# Patient Record
Sex: FEMALE | Race: WHITE | NOT HISPANIC OR LATINO | URBAN - METROPOLITAN AREA
[De-identification: names, ages, dates, MRNs, and addresses within clinical notes are randomized per-mention and may not be internally consistent; named-entity substitution may affect disease eponyms.]

---

## 2018-04-17 ENCOUNTER — INPATIENT (INPATIENT)
Facility: HOSPITAL | Age: 63
LOS: 9 days | Discharge: NURSING, REHAB OR LONG-TERM CARE | DRG: 494 | End: 2018-04-27
Attending: ORTHOPAEDIC SURGERY | Admitting: ORTHOPAEDIC SURGERY
Payer: COMMERCIAL

## 2018-04-17 VITALS
RESPIRATION RATE: 17 BRPM | HEART RATE: 88 BPM | TEMPERATURE: 97 F | DIASTOLIC BLOOD PRESSURE: 68 MMHG | SYSTOLIC BLOOD PRESSURE: 119 MMHG | OXYGEN SATURATION: 92 %
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
## 2018-04-17 LAB
ALBUMIN SERPL ELPH-MCNC: 4.2 G/DL — SIGNIFICANT CHANGE UP (ref 3.3–5)
ALP SERPL-CCNC: 68 U/L — SIGNIFICANT CHANGE UP (ref 40–120)
ALT FLD-CCNC: 20 U/L — SIGNIFICANT CHANGE UP (ref 10–45)
ANION GAP SERPL CALC-SCNC: 9 MMOL/L — SIGNIFICANT CHANGE UP (ref 5–17)
APPEARANCE UR: CLEAR — SIGNIFICANT CHANGE UP
APTT BLD: 34 SEC — SIGNIFICANT CHANGE UP (ref 27.5–37.4)
AST SERPL-CCNC: 17 U/L — SIGNIFICANT CHANGE UP (ref 10–40)
BASOPHILS NFR BLD AUTO: 0.2 % — SIGNIFICANT CHANGE UP (ref 0–2)
BILIRUB SERPL-MCNC: 0.4 MG/DL — SIGNIFICANT CHANGE UP (ref 0.2–1.2)
BILIRUB UR-MCNC: NEGATIVE — SIGNIFICANT CHANGE UP
BUN SERPL-MCNC: 13 MG/DL — SIGNIFICANT CHANGE UP (ref 7–23)
CALCIUM SERPL-MCNC: 9.5 MG/DL — SIGNIFICANT CHANGE UP (ref 8.4–10.5)
CHLORIDE SERPL-SCNC: 102 MMOL/L — SIGNIFICANT CHANGE UP (ref 96–108)
CO2 SERPL-SCNC: 30 MMOL/L — SIGNIFICANT CHANGE UP (ref 22–31)
COLOR SPEC: YELLOW — SIGNIFICANT CHANGE UP
CREAT SERPL-MCNC: 0.73 MG/DL — SIGNIFICANT CHANGE UP (ref 0.5–1.3)
DIFF PNL FLD: NEGATIVE — SIGNIFICANT CHANGE UP
EOSINOPHIL NFR BLD AUTO: 0.3 % — SIGNIFICANT CHANGE UP (ref 0–6)
GLUCOSE SERPL-MCNC: 119 MG/DL — HIGH (ref 70–99)
GLUCOSE UR QL: NEGATIVE — SIGNIFICANT CHANGE UP
HCT VFR BLD CALC: 39 % — SIGNIFICANT CHANGE UP (ref 34.5–45)
HGB BLD-MCNC: 12.7 G/DL — SIGNIFICANT CHANGE UP (ref 11.5–15.5)
INR BLD: 1 — SIGNIFICANT CHANGE UP (ref 0.88–1.16)
KETONES UR-MCNC: NEGATIVE — SIGNIFICANT CHANGE UP
LEUKOCYTE ESTERASE UR-ACNC: NEGATIVE — SIGNIFICANT CHANGE UP
LYMPHOCYTES # BLD AUTO: 22.7 % — SIGNIFICANT CHANGE UP (ref 13–44)
MCHC RBC-ENTMCNC: 28.9 PG — SIGNIFICANT CHANGE UP (ref 27–34)
MCHC RBC-ENTMCNC: 32.6 G/DL — SIGNIFICANT CHANGE UP (ref 32–36)
MCV RBC AUTO: 88.6 FL — SIGNIFICANT CHANGE UP (ref 80–100)
MONOCYTES NFR BLD AUTO: 11.8 % — SIGNIFICANT CHANGE UP (ref 2–14)
NEUTROPHILS NFR BLD AUTO: 65 % — SIGNIFICANT CHANGE UP (ref 43–77)
NITRITE UR-MCNC: NEGATIVE — SIGNIFICANT CHANGE UP
PH UR: 6.5 — SIGNIFICANT CHANGE UP (ref 5–8)
PLATELET # BLD AUTO: 312 K/UL — SIGNIFICANT CHANGE UP (ref 150–400)
POTASSIUM SERPL-MCNC: 4.4 MMOL/L — SIGNIFICANT CHANGE UP (ref 3.5–5.3)
POTASSIUM SERPL-SCNC: 4.4 MMOL/L — SIGNIFICANT CHANGE UP (ref 3.5–5.3)
PROT SERPL-MCNC: 7 G/DL — SIGNIFICANT CHANGE UP (ref 6–8.3)
PROT UR-MCNC: NEGATIVE MG/DL — SIGNIFICANT CHANGE UP
PROTHROM AB SERPL-ACNC: 11.1 SEC — SIGNIFICANT CHANGE UP (ref 9.8–12.7)
RBC # BLD: 4.4 M/UL — SIGNIFICANT CHANGE UP (ref 3.8–5.2)
RBC # FLD: 13.7 % — SIGNIFICANT CHANGE UP (ref 10.3–16.9)
SODIUM SERPL-SCNC: 141 MMOL/L — SIGNIFICANT CHANGE UP (ref 135–145)
SP GR SPEC: 1.01 — SIGNIFICANT CHANGE UP (ref 1–1.03)
UROBILINOGEN FLD QL: 0.2 E.U./DL — SIGNIFICANT CHANGE UP
WBC # BLD: 12.6 K/UL — HIGH (ref 3.8–10.5)
WBC # FLD AUTO: 12.6 K/UL — HIGH (ref 3.8–10.5)

## 2018-04-17 PROCEDURE — 73700 CT LOWER EXTREMITY W/O DYE: CPT | Mod: 26,LT

## 2018-04-17 RX ORDER — METOCLOPRAMIDE HCL 10 MG
10 TABLET ORAL EVERY 6 HOURS
Qty: 0 | Refills: 0 | Status: DISCONTINUED | OUTPATIENT
Start: 2018-04-17 | End: 2018-04-24

## 2018-04-17 RX ORDER — ACETAMINOPHEN 500 MG
650 TABLET ORAL EVERY 6 HOURS
Qty: 0 | Refills: 0 | Status: DISCONTINUED | OUTPATIENT
Start: 2018-04-17 | End: 2018-04-24

## 2018-04-17 RX ORDER — MORPHINE SULFATE 50 MG/1
4 CAPSULE, EXTENDED RELEASE ORAL EVERY 4 HOURS
Qty: 0 | Refills: 0 | Status: DISCONTINUED | OUTPATIENT
Start: 2018-04-17 | End: 2018-04-24

## 2018-04-17 RX ORDER — OXYCODONE HYDROCHLORIDE 5 MG/1
10 TABLET ORAL EVERY 4 HOURS
Qty: 0 | Refills: 0 | Status: DISCONTINUED | OUTPATIENT
Start: 2018-04-17 | End: 2018-04-24

## 2018-04-17 RX ORDER — SODIUM CHLORIDE 9 MG/ML
1000 INJECTION, SOLUTION INTRAVENOUS
Qty: 0 | Refills: 0 | Status: DISCONTINUED | OUTPATIENT
Start: 2018-04-17 | End: 2018-04-24

## 2018-04-17 RX ORDER — OXYCODONE HYDROCHLORIDE 5 MG/1
5 TABLET ORAL EVERY 4 HOURS
Qty: 0 | Refills: 0 | Status: DISCONTINUED | OUTPATIENT
Start: 2018-04-17 | End: 2018-04-24

## 2018-04-17 RX ORDER — MAGNESIUM HYDROXIDE 400 MG/1
30 TABLET, CHEWABLE ORAL DAILY
Qty: 0 | Refills: 0 | Status: DISCONTINUED | OUTPATIENT
Start: 2018-04-17 | End: 2018-04-24

## 2018-04-17 RX ORDER — HEPARIN SODIUM 5000 [USP'U]/ML
5000 INJECTION INTRAVENOUS; SUBCUTANEOUS ONCE
Qty: 0 | Refills: 0 | Status: COMPLETED | OUTPATIENT
Start: 2018-04-17 | End: 2018-04-17

## 2018-04-17 RX ORDER — DOCUSATE SODIUM 100 MG
100 CAPSULE ORAL THREE TIMES A DAY
Qty: 0 | Refills: 0 | Status: DISCONTINUED | OUTPATIENT
Start: 2018-04-17 | End: 2018-04-24

## 2018-04-17 RX ADMIN — HEPARIN SODIUM 5000 UNIT(S): 5000 INJECTION INTRAVENOUS; SUBCUTANEOUS at 21:21

## 2018-04-17 RX ADMIN — MORPHINE SULFATE 4 MILLIGRAM(S): 50 CAPSULE, EXTENDED RELEASE ORAL at 20:03

## 2018-04-17 RX ADMIN — Medication 100 MILLIGRAM(S): at 21:21

## 2018-04-17 RX ADMIN — MORPHINE SULFATE 4 MILLIGRAM(S): 50 CAPSULE, EXTENDED RELEASE ORAL at 20:30

## 2018-04-17 NOTE — H&P ADULT - NSHPPHYSICALEXAM_GEN_ALL_CORE
WWP, BCR  DP/PT could not palpate due to the splint. But Toes WWP  Motor: EHL/FHL splint in place  Sensory: DP/SP/AT, MP/LP/S/S SILT--- Could not assess due to splint but can feel the toes  ROM: Can wiggle toes. In AO splint, that was partially removed to examine the skin. No break in the integrity of the skin or any clisters. Although significant swelling.  Deformity: L Ankle Splint from OSH

## 2018-04-17 NOTE — H&P ADULT - ASSESSMENT
62 F with L Ankle fracture.  1. Pre Op  2. NPO p MN  3. Elevate  4. Ice pack  5. Pre Op clearance  6. Added on  7. Consented  8. CT  9. Pain Control  10. SQH x 1  11. IVFs

## 2018-04-17 NOTE — H&P ADULT - HISTORY OF PRESENT ILLNESS
62 F, s/p fall with L ankle fracture. Patient presented to the ED at [facility] and has been transferred here for surgical management.

## 2018-04-18 DIAGNOSIS — S82.899A OTHER FRACTURE OF UNSPECIFIED LOWER LEG, INITIAL ENCOUNTER FOR CLOSED FRACTURE: ICD-10-CM

## 2018-04-18 DIAGNOSIS — Z01.818 ENCOUNTER FOR OTHER PREPROCEDURAL EXAMINATION: ICD-10-CM

## 2018-04-18 LAB
BLD GP AB SCN SERPL QL: NEGATIVE — SIGNIFICANT CHANGE UP
RH IG SCN BLD-IMP: POSITIVE — SIGNIFICANT CHANGE UP

## 2018-04-18 PROCEDURE — 99222 1ST HOSP IP/OBS MODERATE 55: CPT | Mod: GC

## 2018-04-18 PROCEDURE — 93010 ELECTROCARDIOGRAM REPORT: CPT

## 2018-04-18 RX ORDER — HEPARIN SODIUM 5000 [USP'U]/ML
5000 INJECTION INTRAVENOUS; SUBCUTANEOUS EVERY 12 HOURS
Qty: 0 | Refills: 0 | Status: DISCONTINUED | OUTPATIENT
Start: 2018-04-18 | End: 2018-04-18

## 2018-04-18 RX ORDER — HEPARIN SODIUM 5000 [USP'U]/ML
7500 INJECTION INTRAVENOUS; SUBCUTANEOUS EVERY 8 HOURS
Qty: 0 | Refills: 0 | Status: DISCONTINUED | OUTPATIENT
Start: 2018-04-18 | End: 2018-04-23

## 2018-04-18 RX ORDER — PANTOPRAZOLE SODIUM 20 MG/1
40 TABLET, DELAYED RELEASE ORAL
Qty: 0 | Refills: 0 | Status: DISCONTINUED | OUTPATIENT
Start: 2018-04-18 | End: 2018-04-24

## 2018-04-18 RX ORDER — HEPARIN SODIUM 5000 [USP'U]/ML
5000 INJECTION INTRAVENOUS; SUBCUTANEOUS ONCE
Qty: 0 | Refills: 0 | Status: DISCONTINUED | OUTPATIENT
Start: 2018-04-18 | End: 2018-04-18

## 2018-04-18 RX ADMIN — HEPARIN SODIUM 7500 UNIT(S): 5000 INJECTION INTRAVENOUS; SUBCUTANEOUS at 22:00

## 2018-04-18 RX ADMIN — MORPHINE SULFATE 4 MILLIGRAM(S): 50 CAPSULE, EXTENDED RELEASE ORAL at 16:05

## 2018-04-18 RX ADMIN — Medication 100 MILLIGRAM(S): at 21:16

## 2018-04-18 RX ADMIN — MORPHINE SULFATE 4 MILLIGRAM(S): 50 CAPSULE, EXTENDED RELEASE ORAL at 10:42

## 2018-04-18 RX ADMIN — OXYCODONE HYDROCHLORIDE 10 MILLIGRAM(S): 5 TABLET ORAL at 19:19

## 2018-04-18 RX ADMIN — MORPHINE SULFATE 4 MILLIGRAM(S): 50 CAPSULE, EXTENDED RELEASE ORAL at 10:57

## 2018-04-18 RX ADMIN — SODIUM CHLORIDE 100 MILLILITER(S): 9 INJECTION, SOLUTION INTRAVENOUS at 00:25

## 2018-04-18 RX ADMIN — OXYCODONE HYDROCHLORIDE 10 MILLIGRAM(S): 5 TABLET ORAL at 18:19

## 2018-04-18 RX ADMIN — MORPHINE SULFATE 4 MILLIGRAM(S): 50 CAPSULE, EXTENDED RELEASE ORAL at 05:38

## 2018-04-18 RX ADMIN — MORPHINE SULFATE 4 MILLIGRAM(S): 50 CAPSULE, EXTENDED RELEASE ORAL at 05:55

## 2018-04-18 RX ADMIN — MORPHINE SULFATE 4 MILLIGRAM(S): 50 CAPSULE, EXTENDED RELEASE ORAL at 15:49

## 2018-04-18 NOTE — PROGRESS NOTE ADULT - SUBJECTIVE AND OBJECTIVE BOX
Ortho Preop Note    Patient is a 62y old  Female who presents with a chief complaint of L Ankle PER Type 4 (2018 20:50)    Diagnosis: L Ankle PER type 4  Procedure: L Ankle ORIF  Surgeon: Octavio                          [id number]   12.6  )-----------( 312      ( 2018 22:39 )             39.0         141  |  102  |  13  ----------------------------<  119<H>  4.4   |  30  |  0.73    Ca    9.5      2018 22:39    TPro  7.0  /  Alb  4.2  /  TBili  0.4  /  DBili  x   /  AST    /  ALT  20  /  AlkPhos  68      PT/INR - ( 2018 22:39 )   PT: 11.1 sec;   INR: 1.00          PTT - ( 2018 22:39 )  PTT:34.0 sec  Urinalysis Basic - ( 2018 22:06 )    Color: Yellow / Appearance: Clear / S.010 / pH: x  Gluc: x / Ketone: NEGATIVE  / Bili: Negative / Urobili: 0.2 E.U./dL   Blood: x / Protein: NEGATIVE mg/dL / Nitrite: NEGATIVE   Leuk Esterase: NEGATIVE / RBC: x / WBC x   Sq Epi: x / Non Sq Epi: x / Bacteria: x        [ x] Type & Screen  [x ] CBC  [x ] BMP  [x ] PT/PTT/INR  [x ] Urinalysis  [x ] Chest X-ray  [x ] EKG  [x ] NPO/IVF  [x ] Consent  [ ] Clearance  [x ] Added on to OR Schedule  [x ] Anti-coagulation held  [ ] MRSA/MSSA Nasal Screen     Assessment & Plan:  62yFemale with L Ankle fracture  -For OR     Ortho Pager [phone]

## 2018-04-18 NOTE — PRE-OP CHECKLIST - SELECT TESTS ORDERED
BMP/PT/PTT/INR/EKG/Urinalysis/Type and Screen/CBC CBC/Urinalysis/Type and Screen/PT/PTT/INR/EKG/CXR/BMP

## 2018-04-18 NOTE — CONSULT NOTE ADULT - ATTENDING COMMENTS
Patient seen and examined with house-staff during bedside rounds.  Resident note read, including vitals, physical findings, laboratory data, and radiological reports.   Revisions included below.  Direct personal management at bed side and extensive interpretation of the data.  Plan was outlined and discussed in details with the housestaff.  Decision making of high complexity  Action taken for acute disease activity to reflect the level of care provided:  - medication reconciliation  - review laboratory data  - preop evaluation  - risk assessment

## 2018-04-18 NOTE — CONSULT NOTE ADULT - PROBLEM SELECTOR RECOMMENDATION 2
The patient's medical condition is optimized for surgery.  There is no contraindication for surgery.  There is no clinical evidence neither of angina, decompensated CHF, arrhthymias, nor valvular disease.   There is no limitation of exercise capacity.  MET is  5.  ASA class is2 .  Fitzpatrick cardiac risk factor is low .  DVT prophylaxis is indicated.  Pain control.  Early mobilization.  Avoid fluid overload.  I will discuss smoke cessation postop

## 2018-04-18 NOTE — CONSULT NOTE ADULT - SUBJECTIVE AND OBJECTIVE BOX
Patient is a 62y old  Female who presents with a chief complaint of L Ankle PER Type 4 (2018 20:50)      HPI:  62 F, s/p fall with L ankle fracture. Patient presented to the ED at [facility] and has been transferred here for surgical management. (2018 20:50)      PAST MEDICAL & SURGICAL HISTORY:  No pertinent past medical history  No significant past surgical history      FAMILY HISTORY:  No pertinent family history in first degree relatives      SOCIAL HISTORY:  Smoking Status: [x ] Current, [ ] Former, [ ] Never  Pack Years:    MEDICATIONS:  Pulmonary:    Antimicrobials:    Anticoagulants:    Onc:    GI/:  docusate sodium 100 milliGRAM(s) Oral three times a day  magnesium hydroxide Suspension 30 milliLiter(s) Oral daily  pantoprazole    Tablet 40 milliGRAM(s) Oral before breakfast    Endocrine:    Cardiac:    Other Medications:  acetaminophen   Tablet 650 milliGRAM(s) Oral every 6 hours PRN  acetaminophen   Tablet. 650 milliGRAM(s) Oral every 6 hours PRN  lactated ringers. 1000 milliLiter(s) IV Continuous <Continuous>  metoclopramide Injectable 10 milliGRAM(s) IV Push every 6 hours PRN  morphine  - Injectable 4 milliGRAM(s) IV Push every 4 hours PRN  oxyCODONE    IR 10 milliGRAM(s) Oral every 4 hours PRN  oxyCODONE    IR 5 milliGRAM(s) Oral every 4 hours PRN      Allergies    No Known Allergies    Intolerances        Vital Signs Last 24 Hrs  T(C): 37.5 (2018 05:27), Max: 37.5 (2018 05:27)  T(F): 99.5 (2018 05:27), Max: 99.5 (2018 05:27)  HR: 88 (2018 05:27) (87 - 88)  BP: 141/64 (2018 05:27) (119/68 - 141/64)  BP(mean): --  RR: 17 (2018 05:27) (17 - 18)  SpO2: 93% (2018 05:27) (92% - 93%)     @ 07:01  -   @ 07:00  --------------------------------------------------------  IN: 700 mL / OUT: 750 mL / NET: -50 mL          LABS:      CBC Full  -  ( 2018 22:39 )  WBC Count : 12.6 K/uL  Hemoglobin : 12.7 g/dL  Hematocrit : 39.0 %  Platelet Count - Automated : 312 K/uL  Mean Cell Volume : 88.6 fL  Mean Cell Hemoglobin : 28.9 pg  Mean Cell Hemoglobin Concentration : 32.6 g/dL  Auto Neutrophil # : x  Auto Lymphocyte # : x  Auto Monocyte # : x  Auto Eosinophil # : x  Auto Basophil # : x  Auto Neutrophil % : 65.0 %  Auto Lymphocyte % : 22.7 %  Auto Monocyte % : 11.8 %  Auto Eosinophil % : 0.3 %  Auto Basophil % : 0.2 %        141  |  102  |  13  ----------------------------<  119<H>  4.4   |  30  |  0.73    Ca    9.5      2018 22:39    TPro  7.0  /  Alb  4.2  /  TBili  0.4  /  DBili  x   /  AST  17  /  ALT  20  /  AlkPhos  68  -    PT/INR - ( 2018 22:39 )   PT: 11.1 sec;   INR: 1.00     CXR clear  - EKG RSR normal     PTT - ( 2018 22:39 )  PTT:34.0 sec      Urinalysis Basic - ( 2018 22:06 )    Color: Yellow / Appearance: Clear / S.010 / pH: x  Gluc: x / Ketone: NEGATIVE  / Bili: Negative / Urobili: 0.2 E.U./dL   Blood: x / Protein: NEGATIVE mg/dL / Nitrite: NEGATIVE   Leuk Esterase: NEGATIVE / RBC: x / WBC x   Sq Epi: x / Non Sq Epi: x / Bacteria: x                  RADIOLOGY & ADDITIONAL STUDIES (The following images were personally reviewed):

## 2018-04-19 ENCOUNTER — TRANSCRIPTION ENCOUNTER (OUTPATIENT)
Age: 63
End: 2018-04-19

## 2018-04-19 LAB
ANION GAP SERPL CALC-SCNC: 10 MMOL/L — SIGNIFICANT CHANGE UP (ref 5–17)
BUN SERPL-MCNC: 12 MG/DL — SIGNIFICANT CHANGE UP (ref 7–23)
CALCIUM SERPL-MCNC: 9.3 MG/DL — SIGNIFICANT CHANGE UP (ref 8.4–10.5)
CHLORIDE SERPL-SCNC: 99 MMOL/L — SIGNIFICANT CHANGE UP (ref 96–108)
CO2 SERPL-SCNC: 29 MMOL/L — SIGNIFICANT CHANGE UP (ref 22–31)
CREAT SERPL-MCNC: 0.72 MG/DL — SIGNIFICANT CHANGE UP (ref 0.5–1.3)
GLUCOSE SERPL-MCNC: 105 MG/DL — HIGH (ref 70–99)
HCT VFR BLD CALC: 38.7 % — SIGNIFICANT CHANGE UP (ref 34.5–45)
HGB BLD-MCNC: 12.6 G/DL — SIGNIFICANT CHANGE UP (ref 11.5–15.5)
MCHC RBC-ENTMCNC: 28.7 PG — SIGNIFICANT CHANGE UP (ref 27–34)
MCHC RBC-ENTMCNC: 32.6 G/DL — SIGNIFICANT CHANGE UP (ref 32–36)
MCV RBC AUTO: 88.2 FL — SIGNIFICANT CHANGE UP (ref 80–100)
PLATELET # BLD AUTO: 285 K/UL — SIGNIFICANT CHANGE UP (ref 150–400)
POTASSIUM SERPL-MCNC: 4.3 MMOL/L — SIGNIFICANT CHANGE UP (ref 3.5–5.3)
POTASSIUM SERPL-SCNC: 4.3 MMOL/L — SIGNIFICANT CHANGE UP (ref 3.5–5.3)
RBC # BLD: 4.39 M/UL — SIGNIFICANT CHANGE UP (ref 3.8–5.2)
RBC # FLD: 13.4 % — SIGNIFICANT CHANGE UP (ref 10.3–16.9)
SODIUM SERPL-SCNC: 138 MMOL/L — SIGNIFICANT CHANGE UP (ref 135–145)
WBC # BLD: 11.4 K/UL — HIGH (ref 3.8–10.5)
WBC # FLD AUTO: 11.4 K/UL — HIGH (ref 3.8–10.5)

## 2018-04-19 PROCEDURE — 71045 X-RAY EXAM CHEST 1 VIEW: CPT | Mod: 26

## 2018-04-19 RX ADMIN — HEPARIN SODIUM 7500 UNIT(S): 5000 INJECTION INTRAVENOUS; SUBCUTANEOUS at 13:41

## 2018-04-19 RX ADMIN — OXYCODONE HYDROCHLORIDE 10 MILLIGRAM(S): 5 TABLET ORAL at 16:05

## 2018-04-19 RX ADMIN — OXYCODONE HYDROCHLORIDE 10 MILLIGRAM(S): 5 TABLET ORAL at 21:55

## 2018-04-19 RX ADMIN — Medication 100 MILLIGRAM(S): at 13:42

## 2018-04-19 RX ADMIN — MAGNESIUM HYDROXIDE 30 MILLILITER(S): 400 TABLET, CHEWABLE ORAL at 13:42

## 2018-04-19 RX ADMIN — OXYCODONE HYDROCHLORIDE 10 MILLIGRAM(S): 5 TABLET ORAL at 17:05

## 2018-04-19 RX ADMIN — OXYCODONE HYDROCHLORIDE 10 MILLIGRAM(S): 5 TABLET ORAL at 10:27

## 2018-04-19 RX ADMIN — OXYCODONE HYDROCHLORIDE 10 MILLIGRAM(S): 5 TABLET ORAL at 11:27

## 2018-04-19 RX ADMIN — OXYCODONE HYDROCHLORIDE 10 MILLIGRAM(S): 5 TABLET ORAL at 22:21

## 2018-04-19 RX ADMIN — HEPARIN SODIUM 7500 UNIT(S): 5000 INJECTION INTRAVENOUS; SUBCUTANEOUS at 21:57

## 2018-04-19 NOTE — PROGRESS NOTE ADULT - SUBJECTIVE AND OBJECTIVE BOX
ORTHO NOTE    [x ] Pt seen/examined.  [ ] Pt without any complaints/in NAD.    [x ] Pt complains of: Left ankle pain slightly worse than yesterday, but controlled with meds.      ROS: [ ] Fever  [ ] Chills  [ ] CP [ ] SOB [ ] Dysnea  [ ] Palpitations [ ] Cough [ ] N/V/C/D [ ] Paresthia [ ] Other     [ ] ROS  otherwise negative    .    PHYSICAL EXAM:    Vital Signs Last 24 Hrs  T(C): 36.2 (19 Apr 2018 08:51), Max: 37.4 (19 Apr 2018 05:29)  T(F): 97.2 (19 Apr 2018 08:51), Max: 99.3 (19 Apr 2018 05:29)  HR: 93 (19 Apr 2018 08:51) (86 - 100)  BP: 133/61 (19 Apr 2018 08:51) (131/75 - 152/79)  BP(mean): --  RR: 15 (19 Apr 2018 08:51) (15 - 16)  SpO2: 93% (19 Apr 2018 08:52) (90% - 100%)    I&O's Detail    18 Apr 2018 07:01  -  19 Apr 2018 07:00  --------------------------------------------------------  IN:    lactated ringers.: 300 mL  Total IN: 300 mL    OUT:    Voided: 700 mL  Total OUT: 700 mL    Total NET: -400 mL      19 Apr 2018 07:01  -  19 Apr 2018 12:00  --------------------------------------------------------  IN:    Oral Fluid: 240 mL  Total IN: 240 mL    OUT:    Voided: 400 mL  Total OUT: 400 mL    Total NET: -160 mL           CAPILLARY BLOOD GLUCOSE                      Neuro:    Lungs:    CV:    ABD:     Ext: Left ankle 5/5 EHL FHL, SILT WWP  Swelling noted, small 1cm x1cm blister anterior shin    LABS   19 Apr 2018 08:29    138    |  99     |  12     ----------------------------<  105    4.3     |  29     |  0.72     Ca    9.3        19 Apr 2018 08:29                                   12.6   11.4  )-----------( 285      ( 19 Apr 2018 08:29 )             38.7             PT/INR - ( 17 Apr 2018 22:39 )   PT: 11.1 sec;   INR: 1.00          PTT - ( 17 Apr 2018 22:39 )  PTT:34.0 sec    [ ] Other Labs  [ ] None ordered            Please check or [place] when present:  •  Heart Failure:    [ ] Acute        [ ]  Acute on Chronic        [ ] Chronic         [ ] Diastolic     [ ]  Combined    •  MALCOLM:     [ ] ATN        [ ]  Renal medullary necrosis       [ ]  Renal cortical necrosis                  [ ] Other pathological Lesion:  •  CKD:  [ ] Stage I   [ ] Stage II  [ ] Stage III    [ ]Stage IV   [ ]  CKD V   [ ]  Other/Unspecified:    •  Abdominal Nutritional Status:   [ ] Malnutrition-See Nutrition note    [ ] Cachexia   [ ]  Other        [ ] Supplement ordered:            [ ] Morbid Obesity: BMI >=40         ASSESSMENT/PLAN: 62 F with left ankle fracture awaiting ORIF.  Plan for OR Tuesday when swelling subsides.    CONTINUE:          [ ] PT NWB LLE    [ ] DVT PPX-HSQ    [ ] Pain Mgt    [ ] Dispo plan-Pending clinical progression.

## 2018-04-19 NOTE — DISCHARGE NOTE ADULT - HOSPITAL COURSE
Admitted 4/17/18 with plan of L Ankle ORIF  Surgery  Nichole-op Antibiotics  Pain control  DVT prophylaxis  OOB/Physical Therapy Admitted 4/17/18 with plan of L Ankle ORIF  Surgery ORIF 4/24/18.  Nichole-op Antibiotics  Pain control  DVT prophylaxis  OOB/Physical Therapy

## 2018-04-19 NOTE — DISCHARGE NOTE ADULT - CARE PROVIDER_API CALL
Darren Tidwell), Orthopaedic Surgery Surgery  [address] [place] [address]  Phone: [phone]  Fax: [phone]

## 2018-04-19 NOTE — DISCHARGE NOTE ADULT - PATIENT PORTAL LINK FT
You can access the [facility] Patient Portal, offered by [facility], by registering with the following website: [email or web address]

## 2018-04-19 NOTE — DISCHARGE NOTE ADULT - CARE PLAN
Principal Discharge DX:	Ankle fracture  Goal:	Improvement Principal Discharge DX:	Ankle fracture  Goal:	Improvement  Assessment and plan of treatment:	You are non weight-bearing of the left leg while ambulating.   No strenuous activity, heavy lifting, driving or returning to work until cleared by MD.  Keep splint clean, dry, and intact. You may sponge-bathe.   Try to have regular bowel movements, take stool softener or laxative if necessary.  May take Pepcid or Zantac for upset stomach.  May take Aleve or Naproxen instead of Meloxicam.  Swelling may travel all the way down leg to foot, this is normal and will subside in a few weeks.  Call to schedule an appointment with Dr. Tidwell for follow up, if you have staples or sutures they will be removed in office.  Contact your doctor if you experience: fever greater than 101.5, chills, chest pain, difficulty breathing, redness or excessive drainage around the incision, other concerns.  Follow up with your primary care provider.

## 2018-04-19 NOTE — DISCHARGE NOTE ADULT - PLAN OF CARE
Improvement You are non weight-bearing of the left leg while ambulating.   No strenuous activity, heavy lifting, driving or returning to work until cleared by MD.  Keep splint clean, dry, and intact. You may sponge-bathe.   Try to have regular bowel movements, take stool softener or laxative if necessary.  May take Pepcid or Zantac for upset stomach.  May take Aleve or Naproxen instead of Meloxicam.  Swelling may travel all the way down leg to foot, this is normal and will subside in a few weeks.  Call to schedule an appointment with Dr. Tidwell for follow up, if you have staples or sutures they will be removed in office.  Contact your doctor if you experience: fever greater than 101.5, chills, chest pain, difficulty breathing, redness or excessive drainage around the incision, other concerns.  Follow up with your primary care provider.

## 2018-04-19 NOTE — PROGRESS NOTE ADULT - SUBJECTIVE AND OBJECTIVE BOX
Patient seen and examined at bedside.    No acute events.  Pain tolerable.     Denies chest pain/SOB.  No headache.  N PO for OR  today with Dr. Cunningham / Diann.    T(C): 37.4 (04-19-18 @ 05:29), Max: 37.4 (04-19-18 @ 05:29)  T(F): 99.3 (04-19-18 @ 05:29), Max: 99.3 (04-19-18 @ 05:29)  HR:  (86 - 100)  BP:  (131/75 - 152/79)  RR:  (14 - 16)  SpO2:  (94% - 100%)  Wt(kg): --    NAD, non labored respirations.  Abd soft, NTND.  dressing CDI  NVID   wwp and SILT                              12.7   12.6<H> )-------------------( 312      ( 04-17 @ 22:39 )                 39.0       I&O's Detail    17 Apr 2018 07:01  -  18 Apr 2018 07:00  --------------------------------------------------------  IN:    lactated ringers.: 700 mL  Total IN: 700 mL    OUT:    Voided: 750 mL  Total OUT: 750 mL    Total NET: -50 mL      18 Apr 2018 07:01  -  19 Apr 2018 06:07  --------------------------------------------------------  IN:    lactated ringers.: 300 mL  Total IN: 300 mL    OUT:    Voided: 700 mL  Total OUT: 700 mL    Total NET: -400 mL    A&P:    62 F with L Ankle PER Type 4 .    Surgery was delayed yesterday due to swelling. Leg has been elevated with Ice packs. To OR today 4/19.    Keep NPO,  Hold SQH  SCDs only  IVFs  Pain Control  LLE NWB.

## 2018-04-19 NOTE — DISCHARGE NOTE ADULT - MEDICATION SUMMARY - MEDICATIONS TO TAKE
I will START or STAY ON the medications listed below when I get home from the hospital:    oxyCODONE 5 mg oral tablet  -- 1 tab(s) by mouth every 4 hours, As needed, Mild Pain  -- Indication: For Pain    oxyCODONE 10 mg oral tablet  -- 1 tab(s) by mouth every 4 hours, As needed, Moderate Pain  -- Indication: For Pain    aspirin 325 mg oral delayed release tablet  -- 1 tab(s) by mouth 2 times a day  -- Indication: For DVT prophylaxis    docusate sodium 100 mg oral capsule  -- 1 cap(s) by mouth 3 times a day  -- Indication: For Constipation    polyethylene glycol 3350 oral powder for reconstitution  -- 17 gram(s) by mouth once a day  -- Indication: For Constipation    senna oral tablet  -- 2 tab(s) by mouth once a day (at bedtime), As needed, Constipation  -- Indication: For Constipation    pantoprazole 40 mg oral delayed release tablet  -- 1 tab(s) by mouth once a day  -- Indication: For GI prophylaxis

## 2018-04-20 RX ADMIN — MAGNESIUM HYDROXIDE 30 MILLILITER(S): 400 TABLET, CHEWABLE ORAL at 12:31

## 2018-04-20 RX ADMIN — Medication 650 MILLIGRAM(S): at 20:44

## 2018-04-20 RX ADMIN — Medication 650 MILLIGRAM(S): at 12:31

## 2018-04-20 RX ADMIN — HEPARIN SODIUM 7500 UNIT(S): 5000 INJECTION INTRAVENOUS; SUBCUTANEOUS at 12:32

## 2018-04-20 RX ADMIN — Medication 650 MILLIGRAM(S): at 21:08

## 2018-04-20 RX ADMIN — HEPARIN SODIUM 7500 UNIT(S): 5000 INJECTION INTRAVENOUS; SUBCUTANEOUS at 06:42

## 2018-04-20 RX ADMIN — Medication 100 MILLIGRAM(S): at 20:43

## 2018-04-20 RX ADMIN — Medication 100 MILLIGRAM(S): at 06:42

## 2018-04-20 RX ADMIN — PANTOPRAZOLE SODIUM 40 MILLIGRAM(S): 20 TABLET, DELAYED RELEASE ORAL at 06:42

## 2018-04-20 RX ADMIN — HEPARIN SODIUM 7500 UNIT(S): 5000 INJECTION INTRAVENOUS; SUBCUTANEOUS at 20:44

## 2018-04-20 RX ADMIN — Medication 650 MILLIGRAM(S): at 06:43

## 2018-04-20 RX ADMIN — Medication 10 MILLIGRAM(S): at 21:36

## 2018-04-20 RX ADMIN — Medication 100 MILLIGRAM(S): at 12:36

## 2018-04-20 RX ADMIN — Medication 650 MILLIGRAM(S): at 13:30

## 2018-04-20 NOTE — DIETITIAN INITIAL EVALUATION ADULT. - ENERGY NEEDS
Height: 5'0" Weight: 200lbs, IBW 100lbs+/-10%, %%, BMI 39  IBW used for calculations as pt >120% of IBW   Nutrient needs based on [facility] standards of care for maintenance in adults.   Needs will increase post-op

## 2018-04-20 NOTE — PROGRESS NOTE ADULT - SUBJECTIVE AND OBJECTIVE BOX
Patient seen and examined at bedside.    No acute events.  Pain tolerable.     Denies chest pain/SOB.  No headache.  Tolerating PO.    T(C): 36.6 (04-19-18 @ 20:27), Max: 37.4 (04-19-18 @ 05:29)  T(F): 97.9 (04-19-18 @ 20:27), Max: 99.3 (04-19-18 @ 05:29)  HR:  (82 - 98)  BP:  (121/73 - 133/61)  RR:  (15 - 18)  SpO2:  (90% - 95%)  Wt(kg): --    NAD, non labored respirations.  Abd soft, NTND.  dressing CDI  NVID   wwp and SILT                              12.6   11.4<H> )-------------------( 285      ( 04-19 @ 08:29 )                 38.7       I&O's Detail    18 Apr 2018 07:01  -  19 Apr 2018 07:00  --------------------------------------------------------  IN:    lactated ringers.: 300 mL  Total IN: 300 mL    OUT:    Voided: 700 mL  Total OUT: 700 mL    Total NET: -400 mL      19 Apr 2018 07:01  -  20 Apr 2018 05:24  --------------------------------------------------------  IN:    Oral Fluid: 600 mL  Total IN: 600 mL    OUT:    Voided: 1200 mL  Total OUT: 1200 mL    Total NET: -600 mL    A&P:    62 F with L Ankle PER Type 4.    Keep the splint intact.  Surgery delayed till next week due to swelling  Keep the foot elevated  Continue to Ice.  [facility]  Reg Diet  IS  NWB LLE

## 2018-04-20 NOTE — PROGRESS NOTE ADULT - SUBJECTIVE AND OBJECTIVE BOX
ORTHO NOTE    [x ] Pt seen/examined.  [x ] Pt without any complaints/in NAD.    [ ] Pt complains of:      ROS: [ ] Fever  [ ] Chills  [ ] CP [ ] SOB [ ] Dysnea  [ ] Palpitations [ ] Cough [ ] N/V/C/D [ ] Paresthia [ ] Other     [ ] ROS  otherwise negative    .    PHYSICAL EXAM:    Vital Signs Last 24 Hrs  T(C): 35.7 (20 Apr 2018 08:55), Max: 36.6 (19 Apr 2018 20:27)  T(F): 96.3 (20 Apr 2018 08:55), Max: 97.9 (19 Apr 2018 20:27)  HR: 86 (20 Apr 2018 08:55) (82 - 98)  BP: 122/58 (20 Apr 2018 08:55) (119/54 - 122/58)  BP(mean): --  RR: 16 (20 Apr 2018 08:55) (16 - 18)  SpO2: 98% (20 Apr 2018 08:55) (93% - 98%)    I&O's Detail    19 Apr 2018 07:01  -  20 Apr 2018 07:00  --------------------------------------------------------  IN:    Oral Fluid: 600 mL  Total IN: 600 mL    OUT:    Voided: 1200 mL  Total OUT: 1200 mL    Total NET: -600 mL      20 Apr 2018 07:01  -  20 Apr 2018 11:54  --------------------------------------------------------  IN:    Oral Fluid: 480 mL  Total IN: 480 mL    OUT:    Voided: 800 mL  Total OUT: 800 mL    Total NET: -320 mL           CAPILLARY BLOOD GLUCOSE                      Neuro:    Lungs:    CV:    ABD:     Ext: Firing toes  Splint CDI  SILT WW    LABS   19 Apr 2018 08:29    138    |  99     |  12     ----------------------------<  105    4.3     |  29     |  0.72     Ca    9.3        19 Apr 2018 08:29                                   12.6   11.4  )-----------( 285      ( 19 Apr 2018 08:29 )             38.7                 [ ] Other Labs  [ ] None ordered            Please check or [place] when present:  •  Heart Failure:    [ ] Acute        [ ]  Acute on Chronic        [ ] Chronic         [ ] Diastolic     [ ]  Combined    •  MALCOLM:     [ ] ATN        [ ]  Renal medullary necrosis       [ ]  Renal cortical necrosis                  [ ] Other pathological Lesion:  •  CKD:  [ ] Stage I   [ ] Stage II  [ ] Stage III    [ ]Stage IV   [ ]  CKD V   [ ]  Other/Unspecified:    •  Abdominal Nutritional Status:   [ ] Malnutrition-See Nutrition note    [ ] Cachexia   [ ]  Other        [ ] Supplement ordered:            [ ] Morbid Obesity: BMI >=40         ASSESSMENT/PLAN:      STATUS POST: Left ankle fracture.  Plan for ORIF Tuesday with Dr. Tidwell.    CONTINUE:          [ ] PT NWB LLE    [ ] DVT PPX-HSQ    [ ] Pain Mgt    [ ] Dispo plan-Pending.

## 2018-04-20 NOTE — DIETITIAN INITIAL EVALUATION ADULT. - OTHER INFO
63yo F s/p L ankle fracture. Plan for surgery Tuesday 4/24. pt seen resting in bed w/ leg elevated. On regular diet and tolerating PO. Endorses good appetite, 75% meals. No apparent GI distress, no N/V/D/C. No issues chewing or swallowing. Skin and GI WDL per flowsheet.

## 2018-04-21 LAB
ANION GAP SERPL CALC-SCNC: 9 MMOL/L — SIGNIFICANT CHANGE UP (ref 5–17)
BUN SERPL-MCNC: 13 MG/DL — SIGNIFICANT CHANGE UP (ref 7–23)
CALCIUM SERPL-MCNC: 9.6 MG/DL — SIGNIFICANT CHANGE UP (ref 8.4–10.5)
CHLORIDE SERPL-SCNC: 103 MMOL/L — SIGNIFICANT CHANGE UP (ref 96–108)
CO2 SERPL-SCNC: 30 MMOL/L — SIGNIFICANT CHANGE UP (ref 22–31)
CREAT SERPL-MCNC: 0.64 MG/DL — SIGNIFICANT CHANGE UP (ref 0.5–1.3)
GLUCOSE SERPL-MCNC: 116 MG/DL — HIGH (ref 70–99)
HCT VFR BLD CALC: 38 % — SIGNIFICANT CHANGE UP (ref 34.5–45)
HGB BLD-MCNC: 12.4 G/DL — SIGNIFICANT CHANGE UP (ref 11.5–15.5)
MCHC RBC-ENTMCNC: 28.9 PG — SIGNIFICANT CHANGE UP (ref 27–34)
MCHC RBC-ENTMCNC: 32.6 G/DL — SIGNIFICANT CHANGE UP (ref 32–36)
MCV RBC AUTO: 88.6 FL — SIGNIFICANT CHANGE UP (ref 80–100)
PLATELET # BLD AUTO: 341 K/UL — SIGNIFICANT CHANGE UP (ref 150–400)
POTASSIUM SERPL-MCNC: 4.4 MMOL/L — SIGNIFICANT CHANGE UP (ref 3.5–5.3)
POTASSIUM SERPL-SCNC: 4.4 MMOL/L — SIGNIFICANT CHANGE UP (ref 3.5–5.3)
RBC # BLD: 4.29 M/UL — SIGNIFICANT CHANGE UP (ref 3.8–5.2)
RBC # FLD: 13.5 % — SIGNIFICANT CHANGE UP (ref 10.3–16.9)
SODIUM SERPL-SCNC: 142 MMOL/L — SIGNIFICANT CHANGE UP (ref 135–145)
WBC # BLD: 11.5 K/UL — HIGH (ref 3.8–10.5)
WBC # FLD AUTO: 11.5 K/UL — HIGH (ref 3.8–10.5)

## 2018-04-21 RX ADMIN — OXYCODONE HYDROCHLORIDE 10 MILLIGRAM(S): 5 TABLET ORAL at 21:40

## 2018-04-21 RX ADMIN — HEPARIN SODIUM 7500 UNIT(S): 5000 INJECTION INTRAVENOUS; SUBCUTANEOUS at 21:40

## 2018-04-21 RX ADMIN — Medication 100 MILLIGRAM(S): at 21:40

## 2018-04-21 RX ADMIN — Medication 650 MILLIGRAM(S): at 06:52

## 2018-04-21 RX ADMIN — Medication 650 MILLIGRAM(S): at 06:04

## 2018-04-21 RX ADMIN — OXYCODONE HYDROCHLORIDE 10 MILLIGRAM(S): 5 TABLET ORAL at 22:40

## 2018-04-21 RX ADMIN — Medication 650 MILLIGRAM(S): at 11:12

## 2018-04-21 RX ADMIN — PANTOPRAZOLE SODIUM 40 MILLIGRAM(S): 20 TABLET, DELAYED RELEASE ORAL at 06:04

## 2018-04-21 RX ADMIN — OXYCODONE HYDROCHLORIDE 10 MILLIGRAM(S): 5 TABLET ORAL at 11:12

## 2018-04-21 RX ADMIN — HEPARIN SODIUM 7500 UNIT(S): 5000 INJECTION INTRAVENOUS; SUBCUTANEOUS at 14:08

## 2018-04-21 RX ADMIN — Medication 100 MILLIGRAM(S): at 06:04

## 2018-04-21 RX ADMIN — HEPARIN SODIUM 7500 UNIT(S): 5000 INJECTION INTRAVENOUS; SUBCUTANEOUS at 06:04

## 2018-04-21 RX ADMIN — OXYCODONE HYDROCHLORIDE 10 MILLIGRAM(S): 5 TABLET ORAL at 11:40

## 2018-04-21 NOTE — PROGRESS NOTE ADULT - SUBJECTIVE AND OBJECTIVE BOX
Ortho Note    Pt comfortable without complaints, pain controlled  Denies CP, SOB, N/V, numbness/tingling     Vital Signs Last 24 Hrs  T(C): 36.7 (04-21-18 @ 08:00), Max: 36.7 (04-21-18 @ 08:00)  T(F): 98 (04-21-18 @ 08:00), Max: 98 (04-21-18 @ 08:00)  HR: 82 (04-21-18 @ 08:00) (82 - 82)  BP: 116/60 (04-21-18 @ 08:00) (116/60 - 116/60)  BP(mean): --  RR: 17 (04-21-18 @ 08:00) (17 - 17)  SpO2: 95% (04-21-18 @ 08:00) (95% - 95%)  AVSS    General: Pt Alert and oriented, NAD  Splint C/D/I, leg elevated, toes not swollen   Pulses: toes wwp   Motor: +toe wiggle  SILT sp/dp                            12.4   11.5  )-----------( 341      ( 21 Apr 2018 07:27 )             38.0   21 Apr 2018 07:27    142    |  103    |  13     ----------------------------<  116    4.4     |  30     |  0.64     Ca    9.6        21 Apr 2018 07:27        A/P: 62F with L ankle frx    OR tuesday for ORIF with wessling  PT, LLE WB   DVT ppx HSQ   Pain control   Dispo pending     Ortho Pager [phone]

## 2018-04-22 LAB
ANION GAP SERPL CALC-SCNC: 8 MMOL/L — SIGNIFICANT CHANGE UP (ref 5–17)
BUN SERPL-MCNC: 16 MG/DL — SIGNIFICANT CHANGE UP (ref 7–23)
CALCIUM SERPL-MCNC: 9.6 MG/DL — SIGNIFICANT CHANGE UP (ref 8.4–10.5)
CHLORIDE SERPL-SCNC: 100 MMOL/L — SIGNIFICANT CHANGE UP (ref 96–108)
CO2 SERPL-SCNC: 31 MMOL/L — SIGNIFICANT CHANGE UP (ref 22–31)
CREAT SERPL-MCNC: 0.74 MG/DL — SIGNIFICANT CHANGE UP (ref 0.5–1.3)
GLUCOSE SERPL-MCNC: 116 MG/DL — HIGH (ref 70–99)
HCT VFR BLD CALC: 39.3 % — SIGNIFICANT CHANGE UP (ref 34.5–45)
HGB BLD-MCNC: 12.5 G/DL — SIGNIFICANT CHANGE UP (ref 11.5–15.5)
MCHC RBC-ENTMCNC: 28.5 PG — SIGNIFICANT CHANGE UP (ref 27–34)
MCHC RBC-ENTMCNC: 31.8 G/DL — LOW (ref 32–36)
MCV RBC AUTO: 89.7 FL — SIGNIFICANT CHANGE UP (ref 80–100)
PLATELET # BLD AUTO: 364 K/UL — SIGNIFICANT CHANGE UP (ref 150–400)
POTASSIUM SERPL-MCNC: 4.9 MMOL/L — SIGNIFICANT CHANGE UP (ref 3.5–5.3)
POTASSIUM SERPL-SCNC: 4.9 MMOL/L — SIGNIFICANT CHANGE UP (ref 3.5–5.3)
RBC # BLD: 4.38 M/UL — SIGNIFICANT CHANGE UP (ref 3.8–5.2)
RBC # FLD: 13.8 % — SIGNIFICANT CHANGE UP (ref 10.3–16.9)
SODIUM SERPL-SCNC: 139 MMOL/L — SIGNIFICANT CHANGE UP (ref 135–145)
WBC # BLD: 11.8 K/UL — HIGH (ref 3.8–10.5)
WBC # FLD AUTO: 11.8 K/UL — HIGH (ref 3.8–10.5)

## 2018-04-22 RX ADMIN — OXYCODONE HYDROCHLORIDE 5 MILLIGRAM(S): 5 TABLET ORAL at 13:06

## 2018-04-22 RX ADMIN — OXYCODONE HYDROCHLORIDE 10 MILLIGRAM(S): 5 TABLET ORAL at 22:23

## 2018-04-22 RX ADMIN — OXYCODONE HYDROCHLORIDE 5 MILLIGRAM(S): 5 TABLET ORAL at 12:05

## 2018-04-22 RX ADMIN — OXYCODONE HYDROCHLORIDE 5 MILLIGRAM(S): 5 TABLET ORAL at 18:01

## 2018-04-22 RX ADMIN — HEPARIN SODIUM 7500 UNIT(S): 5000 INJECTION INTRAVENOUS; SUBCUTANEOUS at 13:31

## 2018-04-22 RX ADMIN — PANTOPRAZOLE SODIUM 40 MILLIGRAM(S): 20 TABLET, DELAYED RELEASE ORAL at 05:50

## 2018-04-22 RX ADMIN — Medication 100 MILLIGRAM(S): at 22:23

## 2018-04-22 RX ADMIN — HEPARIN SODIUM 7500 UNIT(S): 5000 INJECTION INTRAVENOUS; SUBCUTANEOUS at 22:22

## 2018-04-22 RX ADMIN — Medication 100 MILLIGRAM(S): at 05:50

## 2018-04-22 RX ADMIN — Medication 650 MILLIGRAM(S): at 12:06

## 2018-04-22 RX ADMIN — Medication 100 MILLIGRAM(S): at 12:06

## 2018-04-22 RX ADMIN — OXYCODONE HYDROCHLORIDE 10 MILLIGRAM(S): 5 TABLET ORAL at 04:09

## 2018-04-22 RX ADMIN — HEPARIN SODIUM 7500 UNIT(S): 5000 INJECTION INTRAVENOUS; SUBCUTANEOUS at 05:50

## 2018-04-22 RX ADMIN — Medication 650 MILLIGRAM(S): at 13:06

## 2018-04-22 RX ADMIN — MAGNESIUM HYDROXIDE 30 MILLILITER(S): 400 TABLET, CHEWABLE ORAL at 12:06

## 2018-04-22 RX ADMIN — OXYCODONE HYDROCHLORIDE 10 MILLIGRAM(S): 5 TABLET ORAL at 05:06

## 2018-04-22 RX ADMIN — OXYCODONE HYDROCHLORIDE 5 MILLIGRAM(S): 5 TABLET ORAL at 19:04

## 2018-04-22 RX ADMIN — OXYCODONE HYDROCHLORIDE 10 MILLIGRAM(S): 5 TABLET ORAL at 23:23

## 2018-04-22 NOTE — PROGRESS NOTE ADULT - SUBJECTIVE AND OBJECTIVE BOX
Ortho Note    Pt comfortable without complaints, pain controlled  Denies CP, SOB, N/V, numbness/tingling     Vital Signs Last 24 Hrs  T(C): 36.9 (22 Apr 2018 05:47), Max: 36.9 (22 Apr 2018 05:47)  T(F): 98.5 (22 Apr 2018 05:47), Max: 98.5 (22 Apr 2018 05:47)  HR: 88 (22 Apr 2018 05:47) (82 - 90)  BP: 118/55 (22 Apr 2018 05:47) (116/60 - 134/77)  BP(mean): --  RR: 17 (22 Apr 2018 05:47) (17 - 18)  SpO2: 95% (22 Apr 2018 05:47) (95% - 96%)    General: Pt Alert and oriented, NAD  Splint C/D/I, leg elevated, toes not swollen   Pulses: toes wwp   Motor: +toe wiggle  SILT sp/dp                            12.4   11.5  )-----------( 341      ( 21 Apr 2018 07:27 )             38.0   21 Apr 2018 07:27    142    |  103    |  13     ----------------------------<  116    4.4     |  30     |  0.64     Ca    9.6        21 Apr 2018 07:27        A/P: 62F with L ankle frx    OR tuesday for ORIF with wessling  PT, LLE NWB   DVT ppx HSQ   Pain control   Dispo pending     Ortho Pager [phone]

## 2018-04-23 RX ORDER — HEPARIN SODIUM 5000 [USP'U]/ML
7500 INJECTION INTRAVENOUS; SUBCUTANEOUS EVERY 8 HOURS
Qty: 0 | Refills: 0 | Status: DISCONTINUED | OUTPATIENT
Start: 2018-04-23 | End: 2018-04-24

## 2018-04-23 RX ADMIN — OXYCODONE HYDROCHLORIDE 10 MILLIGRAM(S): 5 TABLET ORAL at 22:54

## 2018-04-23 RX ADMIN — PANTOPRAZOLE SODIUM 40 MILLIGRAM(S): 20 TABLET, DELAYED RELEASE ORAL at 05:13

## 2018-04-23 RX ADMIN — Medication 100 MILLIGRAM(S): at 05:14

## 2018-04-23 RX ADMIN — OXYCODONE HYDROCHLORIDE 10 MILLIGRAM(S): 5 TABLET ORAL at 21:54

## 2018-04-23 RX ADMIN — Medication 650 MILLIGRAM(S): at 13:03

## 2018-04-23 RX ADMIN — HEPARIN SODIUM 7500 UNIT(S): 5000 INJECTION INTRAVENOUS; SUBCUTANEOUS at 13:04

## 2018-04-23 RX ADMIN — HEPARIN SODIUM 7500 UNIT(S): 5000 INJECTION INTRAVENOUS; SUBCUTANEOUS at 05:13

## 2018-04-23 RX ADMIN — OXYCODONE HYDROCHLORIDE 5 MILLIGRAM(S): 5 TABLET ORAL at 13:03

## 2018-04-23 RX ADMIN — OXYCODONE HYDROCHLORIDE 10 MILLIGRAM(S): 5 TABLET ORAL at 05:22

## 2018-04-23 RX ADMIN — Medication 100 MILLIGRAM(S): at 21:55

## 2018-04-23 RX ADMIN — Medication 650 MILLIGRAM(S): at 14:00

## 2018-04-23 RX ADMIN — MAGNESIUM HYDROXIDE 30 MILLILITER(S): 400 TABLET, CHEWABLE ORAL at 13:03

## 2018-04-23 RX ADMIN — OXYCODONE HYDROCHLORIDE 10 MILLIGRAM(S): 5 TABLET ORAL at 06:22

## 2018-04-23 RX ADMIN — OXYCODONE HYDROCHLORIDE 5 MILLIGRAM(S): 5 TABLET ORAL at 14:00

## 2018-04-23 RX ADMIN — Medication 100 MILLIGRAM(S): at 13:03

## 2018-04-23 RX ADMIN — HEPARIN SODIUM 7500 UNIT(S): 5000 INJECTION INTRAVENOUS; SUBCUTANEOUS at 21:55

## 2018-04-23 NOTE — PROGRESS NOTE ADULT - SUBJECTIVE AND OBJECTIVE BOX
ORTHO NOTE    [x] Pt seen/examined.  [ ] Pt without any complaints/in NAD.    [x] Pt complains of: Tolerable pain (6/10) in LLE while resting in bed.       ROS: [ ] Fever  [ ] Chills  [ ] CP [ ] SOB [ ] Dysnea  [ ] Palpitations [ ] Cough [ ] N/V/C/D [ ] Paresthia [ ] Other     [x] ROS  otherwise negative        PHYSICAL EXAM:    Vital Signs Last 24 Hrs  T(C): 36.6 (23 Apr 2018 08:35), Max: 37.1 (22 Apr 2018 20:48)  T(F): 97.8 (23 Apr 2018 08:35), Max: 98.7 (22 Apr 2018 20:48)  HR: 80 (23 Apr 2018 08:35) (76 - 81)  BP: 116/57 (23 Apr 2018 08:35) (110/67 - 126/66)  BP(mean): --  RR: 15 (23 Apr 2018 08:35) (15 - 125)  SpO2: 98% (23 Apr 2018 08:35) (95% - 98%)    I&O's Detail    22 Apr 2018 07:01  -  23 Apr 2018 07:00  --------------------------------------------------------  IN:    Oral Fluid: 320 mL  Total IN: 320 mL    OUT:    Voided: 600 mL  Total OUT: 600 mL    Total NET: -280 mL      23 Apr 2018 07:01  -  23 Apr 2018 10:11  --------------------------------------------------------  IN:    Oral Fluid: 360 mL  Total IN: 360 mL    OUT:  Total OUT: 0 mL    Total NET: 360 mL           CAPILLARY BLOOD GLUCOSE                      Neuro: A+Ox3    Lungs: CTA b/l, use of IS encouraged    CV:     ABD: Soft, NT/ND, +BS     Ext: Firing toes  Splint CDI, limb elevated  SILT WWP    LABS   22 Apr 2018 06:28    139    |  100    |  16     ----------------------------<  116    4.9     |  31     |  0.74     Ca    9.6        22 Apr 2018 06:28                                   12.5   11.8  )-----------( 364      ( 22 Apr 2018 06:28 )             39.3                 [ ] Other Labs  [ ] None ordered            Please check or [place] when present:  •  Heart Failure:    [ ] Acute        [ ]  Acute on Chronic        [ ] Chronic         [ ] Diastolic     [ ]  Combined    •  MALCOLM:     [ ] ATN        [ ]  Renal medullary necrosis       [ ]  Renal cortical necrosis                  [ ] Other pathological Lesion:  •  CKD:  [ ] Stage I   [ ] Stage II  [ ] Stage III    [ ]Stage IV   [ ]  CKD V   [ ]  Other/Unspecified:    •  Abdominal Nutritional Status:   [ ] Malnutrition-See Nutrition note    [ ] Cachexia   [ ]  Other        [ ] Supplement ordered:            [ ] Morbid Obesity: BMI >=40         ASSESSMENT/PLAN:       STATUS POST: Left ankle fracture.  Plan for ORIF Tuesday 4/24/18 with Dr. Tidwell, NPO after MN    CONTINUE:          [ ] PT NWB LLE, keep LLE elevated    [ ] DVT PPX-HSQ - to be held for surgery    [ ] Pain Mgt    [ ] Dispo plan-Pending. ORTHO NOTE    [x] Pt seen/examined.  [ ] Pt without any complaints/in NAD.    [x] Pt complains of: Tolerable pain (6/10) in LLE while resting in bed.       ROS: [ ] Fever  [ ] Chills  [ ] CP [ ] SOB [ ] Dysnea  [ ] Palpitations [ ] Cough [ ] N/V/C/D [ ] Paresthia [ ] Other     [x] ROS  otherwise negative        PHYSICAL EXAM:    Vital Signs Last 24 Hrs  T(C): 36.6 (23 Apr 2018 08:35), Max: 37.1 (22 Apr 2018 20:48)  T(F): 97.8 (23 Apr 2018 08:35), Max: 98.7 (22 Apr 2018 20:48)  HR: 80 (23 Apr 2018 08:35) (76 - 81)  BP: 116/57 (23 Apr 2018 08:35) (110/67 - 126/66)  BP(mean): --  RR: 15 (23 Apr 2018 08:35) (15 - 125)  SpO2: 98% (23 Apr 2018 08:35) (95% - 98%)    I&O's Detail    22 Apr 2018 07:01  -  23 Apr 2018 07:00  --------------------------------------------------------  IN:    Oral Fluid: 320 mL  Total IN: 320 mL    OUT:    Voided: 600 mL  Total OUT: 600 mL    Total NET: -280 mL      23 Apr 2018 07:01  -  23 Apr 2018 10:11  --------------------------------------------------------  IN:    Oral Fluid: 360 mL  Total IN: 360 mL    OUT:  Total OUT: 0 mL    Total NET: 360 mL           CAPILLARY BLOOD GLUCOSE                      Neuro: A+Ox3    Lungs: CTA b/l, use of IS encouraged    CV: S1S2 RRR, no murmur    ABD: Soft, NT/ND, +BS     Ext: Firing toes  Splint CDI, limb elevated  SILT WWP    LABS   22 Apr 2018 06:28    139    |  100    |  16     ----------------------------<  116    4.9     |  31     |  0.74     Ca    9.6        22 Apr 2018 06:28                                   12.5   11.8  )-----------( 364      ( 22 Apr 2018 06:28 )             39.3                 [ ] Other Labs  [ ] None ordered            Please check or [place] when present:  •  Heart Failure:    [ ] Acute        [ ]  Acute on Chronic        [ ] Chronic         [ ] Diastolic     [ ]  Combined    •  MALCOLM:     [ ] ATN        [ ]  Renal medullary necrosis       [ ]  Renal cortical necrosis                  [ ] Other pathological Lesion:  •  CKD:  [ ] Stage I   [ ] Stage II  [ ] Stage III    [ ]Stage IV   [ ]  CKD V   [ ]  Other/Unspecified:    •  Abdominal Nutritional Status:   [ ] Malnutrition-See Nutrition note    [ ] Cachexia   [ ]  Other        [ ] Supplement ordered:            [ ] Morbid Obesity: BMI >=40         ASSESSMENT/PLAN:       STATUS POST: Left ankle fracture.  Plan for ORIF Tuesday 4/24/18 with Dr. Tidwell, NPO after MN    CONTINUE:          [x] PT NWB LLE, keep LLE elevated    [x] DVT PPX-HSQ - to be held for surgery    [x] Pain Mgt    [x] Dispo plan-Pending.

## 2018-04-23 NOTE — PROGRESS NOTE ADULT - SUBJECTIVE AND OBJECTIVE BOX
Patient seen and examined at bedside.    No acute events.  Pain tolerable.     Denies chest pain/SOB.  No headache.  Tolerating PO.    T(C): 35.6 (04-23-18 @ 05:10), Max: 37.1 (04-22-18 @ 20:48)  T(F): 96 (04-23-18 @ 05:10), Max: 98.7 (04-22-18 @ 20:48)  HR:  (76 - 85)  BP:  (110/67 - 126/66)  RR:  (15 - 125)  SpO2:  (95% - 97%)  Wt(kg): --    NAD, non labored respirations.  Abd soft, NTND.  dressing CDI  NVID   wwp and SILT                              12.5   11.8<H> )-------------------( 364      ( 04-22 @ 06:28 )                 39.3       I&O's Detail    21 Apr 2018 07:01  -  22 Apr 2018 07:00  --------------------------------------------------------  IN:    Oral Fluid: 320 mL  Total IN: 320 mL    OUT:    Voided: 400 mL  Total OUT: 400 mL    Total NET: -80 mL      22 Apr 2018 07:01  -  23 Apr 2018 06:20  --------------------------------------------------------  IN:    Oral Fluid: 320 mL  Total IN: 320 mL    OUT:    Voided: 600 mL  Total OUT: 600 mL    Total NET: -280 mL      A&P:    L Ankle PER TYpe IV  Pre Op for 4/24 with Dr. Diann COOKO p MN  Hold Anti Coag for surgery  SCDs  Pain Control  Limb elevation.

## 2018-04-24 PROCEDURE — 73610 X-RAY EXAM OF ANKLE: CPT | Mod: 26,LT

## 2018-04-24 RX ORDER — POLYETHYLENE GLYCOL 3350 17 G/17G
17 POWDER, FOR SOLUTION ORAL DAILY
Qty: 0 | Refills: 0 | Status: DISCONTINUED | OUTPATIENT
Start: 2018-04-24 | End: 2018-04-27

## 2018-04-24 RX ORDER — FOLIC ACID 0.8 MG
1 TABLET ORAL DAILY
Qty: 0 | Refills: 0 | Status: DISCONTINUED | OUTPATIENT
Start: 2018-04-24 | End: 2018-04-27

## 2018-04-24 RX ORDER — ASCORBIC ACID 60 MG
500 TABLET,CHEWABLE ORAL
Qty: 0 | Refills: 0 | Status: DISCONTINUED | OUTPATIENT
Start: 2018-04-24 | End: 2018-04-27

## 2018-04-24 RX ORDER — OXYCODONE HYDROCHLORIDE 5 MG/1
5 TABLET ORAL EVERY 4 HOURS
Qty: 0 | Refills: 0 | Status: DISCONTINUED | OUTPATIENT
Start: 2018-04-24 | End: 2018-04-27

## 2018-04-24 RX ORDER — MAGNESIUM HYDROXIDE 400 MG/1
30 TABLET, CHEWABLE ORAL DAILY
Qty: 0 | Refills: 0 | Status: DISCONTINUED | OUTPATIENT
Start: 2018-04-24 | End: 2018-04-27

## 2018-04-24 RX ORDER — DOCUSATE SODIUM 100 MG
100 CAPSULE ORAL THREE TIMES A DAY
Qty: 0 | Refills: 0 | Status: DISCONTINUED | OUTPATIENT
Start: 2018-04-24 | End: 2018-04-27

## 2018-04-24 RX ORDER — CEFAZOLIN SODIUM 1 G
2000 VIAL (EA) INJECTION EVERY 8 HOURS
Qty: 0 | Refills: 0 | Status: COMPLETED | OUTPATIENT
Start: 2018-04-24 | End: 2018-04-25

## 2018-04-24 RX ORDER — METOCLOPRAMIDE HCL 10 MG
10 TABLET ORAL EVERY 12 HOURS
Qty: 0 | Refills: 0 | Status: DISCONTINUED | OUTPATIENT
Start: 2018-04-24 | End: 2018-04-27

## 2018-04-24 RX ORDER — HYDROMORPHONE HYDROCHLORIDE 2 MG/ML
0.5 INJECTION INTRAMUSCULAR; INTRAVENOUS; SUBCUTANEOUS EVERY 4 HOURS
Qty: 0 | Refills: 0 | Status: DISCONTINUED | OUTPATIENT
Start: 2018-04-24 | End: 2018-04-27

## 2018-04-24 RX ORDER — MORPHINE SULFATE 50 MG/1
2 CAPSULE, EXTENDED RELEASE ORAL
Qty: 0 | Refills: 0 | Status: DISCONTINUED | OUTPATIENT
Start: 2018-04-24 | End: 2018-04-27

## 2018-04-24 RX ORDER — SENNA PLUS 8.6 MG/1
2 TABLET ORAL AT BEDTIME
Qty: 0 | Refills: 0 | Status: DISCONTINUED | OUTPATIENT
Start: 2018-04-24 | End: 2018-04-27

## 2018-04-24 RX ORDER — ONDANSETRON 8 MG/1
4 TABLET, FILM COATED ORAL EVERY 6 HOURS
Qty: 0 | Refills: 0 | Status: DISCONTINUED | OUTPATIENT
Start: 2018-04-24 | End: 2018-04-27

## 2018-04-24 RX ORDER — FERROUS SULFATE 325(65) MG
325 TABLET ORAL
Qty: 0 | Refills: 0 | Status: DISCONTINUED | OUTPATIENT
Start: 2018-04-24 | End: 2018-04-27

## 2018-04-24 RX ORDER — PANTOPRAZOLE SODIUM 20 MG/1
40 TABLET, DELAYED RELEASE ORAL DAILY
Qty: 0 | Refills: 0 | Status: DISCONTINUED | OUTPATIENT
Start: 2018-04-24 | End: 2018-04-27

## 2018-04-24 RX ORDER — MORPHINE SULFATE 50 MG/1
2 CAPSULE, EXTENDED RELEASE ORAL EVERY 4 HOURS
Qty: 0 | Refills: 0 | Status: DISCONTINUED | OUTPATIENT
Start: 2018-04-24 | End: 2018-04-27

## 2018-04-24 RX ORDER — OXYCODONE HYDROCHLORIDE 5 MG/1
10 TABLET ORAL EVERY 4 HOURS
Qty: 0 | Refills: 0 | Status: DISCONTINUED | OUTPATIENT
Start: 2018-04-24 | End: 2018-04-27

## 2018-04-24 RX ORDER — ASPIRIN/CALCIUM CARB/MAGNESIUM 324 MG
325 TABLET ORAL
Qty: 0 | Refills: 0 | Status: DISCONTINUED | OUTPATIENT
Start: 2018-04-24 | End: 2018-04-27

## 2018-04-24 RX ORDER — ACETAMINOPHEN 500 MG
975 TABLET ORAL EVERY 8 HOURS
Qty: 0 | Refills: 0 | Status: COMPLETED | OUTPATIENT
Start: 2018-04-24 | End: 2018-04-27

## 2018-04-24 RX ORDER — SODIUM CHLORIDE 9 MG/ML
1000 INJECTION INTRAMUSCULAR; INTRAVENOUS; SUBCUTANEOUS
Qty: 0 | Refills: 0 | Status: DISCONTINUED | OUTPATIENT
Start: 2018-04-24 | End: 2018-04-26

## 2018-04-24 RX ADMIN — SODIUM CHLORIDE 100 MILLILITER(S): 9 INJECTION, SOLUTION INTRAVENOUS at 10:15

## 2018-04-24 RX ADMIN — Medication 975 MILLIGRAM(S): at 22:00

## 2018-04-24 RX ADMIN — Medication 975 MILLIGRAM(S): at 21:22

## 2018-04-24 RX ADMIN — Medication 100 MILLIGRAM(S): at 21:22

## 2018-04-24 RX ADMIN — Medication 325 MILLIGRAM(S): at 21:21

## 2018-04-24 RX ADMIN — PANTOPRAZOLE SODIUM 40 MILLIGRAM(S): 20 TABLET, DELAYED RELEASE ORAL at 05:28

## 2018-04-24 RX ADMIN — SODIUM CHLORIDE 100 MILLILITER(S): 9 INJECTION, SOLUTION INTRAVENOUS at 00:55

## 2018-04-24 RX ADMIN — Medication 100 MILLIGRAM(S): at 21:28

## 2018-04-24 RX ADMIN — Medication 1 TABLET(S): at 21:27

## 2018-04-24 NOTE — BRIEF OPERATIVE NOTE - PROCEDURE
<<-----Click on this checkbox to enter Procedure Ankle surgery  04/24/2018  ORIF LEFT  Active  [id number]

## 2018-04-24 NOTE — PROGRESS NOTE ADULT - SUBJECTIVE AND OBJECTIVE BOX
ORTHO NOTE    [x ] Pt seen/examined.  [x ] Pt without any complaints/in NAD.    [ ] Pt complains of:      ROS: [ ] Fever  [ ] Chills  [ ] CP [ ] SOB [ ] Dysnea  [ ] Palpitations [ ] Cough [ ] N/V/C/D [ ] Paresthia [ ] Other     [ ] ROS  otherwise negative    .    PHYSICAL EXAM:    Vital Signs Last 24 Hrs  T(C): 36.7 (24 Apr 2018 08:58), Max: 37.4 (23 Apr 2018 15:17)  T(F): 98 (24 Apr 2018 08:58), Max: 99.4 (23 Apr 2018 15:17)  HR: 76 (24 Apr 2018 08:58) (72 - 81)  BP: 121/74 (24 Apr 2018 08:58) (109/65 - 121/74)  BP(mean): --  RR: 16 (24 Apr 2018 08:58) (15 - 17)  SpO2: 95% (24 Apr 2018 08:58) (94% - 95%)    I&O's Detail    23 Apr 2018 07:01  -  24 Apr 2018 07:00  --------------------------------------------------------  IN:    Oral Fluid: 720 mL  Total IN: 720 mL    OUT:    Voided: 660 mL  Total OUT: 660 mL    Total NET: 60 mL      24 Apr 2018 07:01  -  24 Apr 2018 11:31  --------------------------------------------------------  IN:    lactated ringers.: 400 mL  Total IN: 400 mL    OUT:    Voided: 350 mL  Total OUT: 350 mL    Total NET: 50 mL           CAPILLARY BLOOD GLUCOSE                      Neuro:    Lungs:    CV:    ABD:     Ext: wiggling toes,  SILT, WWP, swelling decreased.    LABS                              [ ] Other Labs  [ ] None ordered            Please check or [place] when present:  •  Heart Failure:    [ ] Acute        [ ]  Acute on Chronic        [ ] Chronic         [ ] Diastolic     [ ]  Combined    •  MALCOLM:     [ ] ATN        [ ]  Renal medullary necrosis       [ ]  Renal cortical necrosis                  [ ] Other pathological Lesion:  •  CKD:  [ ] Stage I   [ ] Stage II  [ ] Stage III    [ ]Stage IV   [ ]  CKD V   [ ]  Other/Unspecified:    •  Abdominal Nutritional Status:   [ ] Malnutrition-See Nutrition note    [ ] Cachexia   [ ]  Other        [ ] Supplement ordered:            [ ] Morbid Obesity: BMI >=40         ASSESSMENT/PLAN:   Left ankle fracture.  OR today for ORIF. Medically cleared. NPO. Consented.    CONTINUE:          [ ] PT NWB LLE    [ ] DVT PPX-on hold    [ ] Pain Mgt    [ ] Dispo plan-pending progression

## 2018-04-24 NOTE — PROGRESS NOTE ADULT - SUBJECTIVE AND OBJECTIVE BOX
Orthopaedics Post Op Check    Procedure: L ankle ORIF  Surgeon: Diann    Pt comfortable, without complaints  Denies CP, SOB, N/V, numbness/tingling     Vital Signs Last 24 Hrs  T(C): 36.9 (24 Apr 2018 20:30), Max: 36.9 (24 Apr 2018 20:30)  T(F): 98.4 (24 Apr 2018 20:30), Max: 98.4 (24 Apr 2018 20:30)  HR: 92 (24 Apr 2018 20:30) (72 - 94)  BP: 123/70 (24 Apr 2018 20:30) (103/61 - 123/70)  BP(mean): 78 (24 Apr 2018 18:25) (78 - 82)  RR: 15 (24 Apr 2018 20:30) (13 - 20)  SpO2: 95% (24 Apr 2018 20:30) (91% - 99%)  AVSS, NAD    Dressing C/D/I - SL splint on  General: Pt Alert and oriented   wwp  Sensation: s/p block from OR  Motor:  s/p block from OR      A/P: 62yFemale POD#0 s/p above  - Stable  - Pain Control  - DVT ppx  - Post op abx  - PT, WBS: NWB splint  - F/U AM Labs

## 2018-04-24 NOTE — PROGRESS NOTE ADULT - SUBJECTIVE AND OBJECTIVE BOX
Patient seen and examined at bedside.    No acute events.  Pain tolerable.     Denies chest pain/SOB.  No headache.  Tolerating PO.    T(C): 35.8 (04-24-18 @ 05:49), Max: 37.4 (04-23-18 @ 15:17)  T(F): 96.4 (04-24-18 @ 05:49), Max: 99.4 (04-23-18 @ 15:17)  HR:  (72 - 81)  BP:  (109/65 - 116/57)  RR:  (15 - 17)  SpO2:  (94% - 98%)  Wt(kg): --    NAD, non labored respirations.  Abd soft, NTND.  dressing CDI  NVID   wwp and SILT                              12.5   11.8<H> )-------------------( 364      ( 04-22 @ 06:28 )                 39.3       I&O's Detail    22 Apr 2018 07:01  -  23 Apr 2018 07:00  --------------------------------------------------------  IN:    Oral Fluid: 320 mL  Total IN: 320 mL    OUT:    Voided: 600 mL  Total OUT: 600 mL    Total NET: -280 mL      23 Apr 2018 07:01  -  24 Apr 2018 06:17  --------------------------------------------------------  IN:    Oral Fluid: 720 mL  Total IN: 720 mL    OUT:    Voided: 660 mL  Total OUT: 660 mL    Total NET: 60 mL      A&P:      PER Type IV L ankle fracture, To OR today with Dr. Tidwell. Keep NPO.

## 2018-04-25 LAB
ANION GAP SERPL CALC-SCNC: 13 MMOL/L — SIGNIFICANT CHANGE UP (ref 5–17)
BUN SERPL-MCNC: 16 MG/DL — SIGNIFICANT CHANGE UP (ref 7–23)
CALCIUM SERPL-MCNC: 9.5 MG/DL — SIGNIFICANT CHANGE UP (ref 8.4–10.5)
CHLORIDE SERPL-SCNC: 99 MMOL/L — SIGNIFICANT CHANGE UP (ref 96–108)
CO2 SERPL-SCNC: 23 MMOL/L — SIGNIFICANT CHANGE UP (ref 22–31)
CREAT SERPL-MCNC: 0.58 MG/DL — SIGNIFICANT CHANGE UP (ref 0.5–1.3)
GLUCOSE SERPL-MCNC: 107 MG/DL — HIGH (ref 70–99)
HCT VFR BLD CALC: 36.3 % — SIGNIFICANT CHANGE UP (ref 34.5–45)
HGB BLD-MCNC: 11.8 G/DL — SIGNIFICANT CHANGE UP (ref 11.5–15.5)
MCHC RBC-ENTMCNC: 28.7 PG — SIGNIFICANT CHANGE UP (ref 27–34)
MCHC RBC-ENTMCNC: 32.5 G/DL — SIGNIFICANT CHANGE UP (ref 32–36)
MCV RBC AUTO: 88.3 FL — SIGNIFICANT CHANGE UP (ref 80–100)
PLATELET # BLD AUTO: 340 K/UL — SIGNIFICANT CHANGE UP (ref 150–400)
POTASSIUM SERPL-MCNC: 4 MMOL/L — SIGNIFICANT CHANGE UP (ref 3.5–5.3)
POTASSIUM SERPL-SCNC: 4 MMOL/L — SIGNIFICANT CHANGE UP (ref 3.5–5.3)
RBC # BLD: 4.11 M/UL — SIGNIFICANT CHANGE UP (ref 3.8–5.2)
RBC # FLD: 13.4 % — SIGNIFICANT CHANGE UP (ref 10.3–16.9)
SODIUM SERPL-SCNC: 135 MMOL/L — SIGNIFICANT CHANGE UP (ref 135–145)
WBC # BLD: 12.9 K/UL — HIGH (ref 3.8–10.5)
WBC # FLD AUTO: 12.9 K/UL — HIGH (ref 3.8–10.5)

## 2018-04-25 RX ADMIN — Medication 975 MILLIGRAM(S): at 21:40

## 2018-04-25 RX ADMIN — PANTOPRAZOLE SODIUM 40 MILLIGRAM(S): 20 TABLET, DELAYED RELEASE ORAL at 11:22

## 2018-04-25 RX ADMIN — Medication 975 MILLIGRAM(S): at 22:14

## 2018-04-25 RX ADMIN — Medication 325 MILLIGRAM(S): at 11:22

## 2018-04-25 RX ADMIN — OXYCODONE HYDROCHLORIDE 5 MILLIGRAM(S): 5 TABLET ORAL at 10:46

## 2018-04-25 RX ADMIN — OXYCODONE HYDROCHLORIDE 5 MILLIGRAM(S): 5 TABLET ORAL at 16:21

## 2018-04-25 RX ADMIN — OXYCODONE HYDROCHLORIDE 10 MILLIGRAM(S): 5 TABLET ORAL at 19:38

## 2018-04-25 RX ADMIN — Medication 1 TABLET(S): at 21:43

## 2018-04-25 RX ADMIN — OXYCODONE HYDROCHLORIDE 10 MILLIGRAM(S): 5 TABLET ORAL at 20:33

## 2018-04-25 RX ADMIN — Medication 500 MILLIGRAM(S): at 05:24

## 2018-04-25 RX ADMIN — Medication 975 MILLIGRAM(S): at 05:24

## 2018-04-25 RX ADMIN — Medication 325 MILLIGRAM(S): at 05:27

## 2018-04-25 RX ADMIN — OXYCODONE HYDROCHLORIDE 5 MILLIGRAM(S): 5 TABLET ORAL at 15:42

## 2018-04-25 RX ADMIN — OXYCODONE HYDROCHLORIDE 5 MILLIGRAM(S): 5 TABLET ORAL at 11:24

## 2018-04-25 RX ADMIN — Medication 500 MILLIGRAM(S): at 17:54

## 2018-04-25 RX ADMIN — Medication 100 MILLIGRAM(S): at 21:41

## 2018-04-25 RX ADMIN — Medication 975 MILLIGRAM(S): at 14:00

## 2018-04-25 RX ADMIN — OXYCODONE HYDROCHLORIDE 10 MILLIGRAM(S): 5 TABLET ORAL at 23:30

## 2018-04-25 RX ADMIN — Medication 975 MILLIGRAM(S): at 13:37

## 2018-04-25 RX ADMIN — Medication 1 MILLIGRAM(S): at 11:22

## 2018-04-25 RX ADMIN — Medication 100 MILLIGRAM(S): at 13:37

## 2018-04-25 RX ADMIN — Medication 1 TABLET(S): at 13:37

## 2018-04-25 RX ADMIN — Medication 100 MILLIGRAM(S): at 05:27

## 2018-04-25 RX ADMIN — Medication 325 MILLIGRAM(S): at 17:54

## 2018-04-25 RX ADMIN — Medication 1 TABLET(S): at 11:22

## 2018-04-25 RX ADMIN — Medication 975 MILLIGRAM(S): at 06:00

## 2018-04-25 RX ADMIN — Medication 100 MILLIGRAM(S): at 05:23

## 2018-04-25 NOTE — PROGRESS NOTE ADULT - SUBJECTIVE AND OBJECTIVE BOX
Pt had ankle ORIF yesterday    T(F): 97 (04-25-18 @ 04:26), Max: 98.4 (04-24-18 @ 20:30)  HR: 83 (04-25-18 @ 04:26) (76 - 94)  BP: 95/56 (04-25-18 @ 04:26) (95/56 - 123/70)  RR: 15 (04-25-18 @ 04:26) (13 - 20)  SpO2: 96% (04-25-18 @ 04:26) (91% - 99%)  Wt(kg): --    04-24 @ 07:01  -  04-25 @ 06:11  --------------------------------------------------------  IN:    lactated ringers.: 400 mL    sodium chloride 0.9%.: 1440 mL  Total IN: 1840 mL    OUT:    Voided: 750 mL  Total OUT: 750 mL    Total NET: 1090 mL    PE: Sensation to light touch intact S/S/DP/SP/Tib, Strength EHL/FHL/TA/GS 5/5, DP 2+, Ext WWP    A/P: 62y f s/p L ankle ORIF  POD#1  -Pain Control  -PT/NWB  -DVT PPX ASA  -Dispo Planning Pending Pt had ankle ORIF yesterday    T(F): 97 (04-25-18 @ 04:26), Max: 98.4 (04-24-18 @ 20:30)  HR: 83 (04-25-18 @ 04:26) (76 - 94)  BP: 95/56 (04-25-18 @ 04:26) (95/56 - 123/70)  RR: 15 (04-25-18 @ 04:26) (13 - 20)  SpO2: 96% (04-25-18 @ 04:26) (91% - 99%)  Wt(kg): --    04-24 @ 07:01  -  04-25 @ 06:11  --------------------------------------------------------  IN:    lactated ringers.: 400 mL    sodium chloride 0.9%.: 1440 mL  Total IN: 1840 mL    OUT:    Voided: 750 mL  Total OUT: 750 mL    Total NET: 1090 mL    PE: Splint CDI  Patient had block , unable to wiggle toes    A/P: 62y f s/p L ankle ORIF  POD#1  -Pain Control  -PT/NWB  -DVT PPX ASA  -Dispo Planning Pending

## 2018-04-25 NOTE — PROGRESS NOTE ADULT - SUBJECTIVE AND OBJECTIVE BOX
ORTHO NOTE    [x ] Pt seen/examined.  [ ] Pt without any complaints/in NAD.    [x ] Pt complains of: numbness left foot, block likely still in place.      ROS: [ ] Fever  [ ] Chills  [ ] CP [ ] SOB [ ] Dysnea  [ ] Palpitations [ ] Cough [ ] N/V/C/D [ ] Paresthia [ ] Other     [ ] ROS  otherwise negative    .    PHYSICAL EXAM:    Vital Signs Last 24 Hrs  T(C): 36.4 (25 Apr 2018 08:42), Max: 36.9 (24 Apr 2018 20:30)  T(F): 97.6 (25 Apr 2018 08:42), Max: 98.4 (24 Apr 2018 20:30)  HR: 82 (25 Apr 2018 08:42) (82 - 94)  BP: 111/55 (25 Apr 2018 08:42) (95/56 - 123/70)  BP(mean): 78 (24 Apr 2018 18:25) (78 - 82)  RR: 16 (25 Apr 2018 08:42) (13 - 20)  SpO2: 96% (25 Apr 2018 08:42) (91% - 99%)    I&O's Detail    24 Apr 2018 07:01  -  25 Apr 2018 07:00  --------------------------------------------------------  IN:    lactated ringers.: 400 mL    sodium chloride 0.9%.: 1440 mL  Total IN: 1840 mL    OUT:    Voided: 750 mL  Total OUT: 750 mL    Total NET: 1090 mL      25 Apr 2018 07:01  -  25 Apr 2018 11:44  --------------------------------------------------------  IN:    Oral Fluid: 320 mL    sodium chloride 0.9%.: 240 mL  Total IN: 560 mL    OUT:    Voided: 350 mL  Total OUT: 350 mL    Total NET: 210 mL           CAPILLARY BLOOD GLUCOSE                      Neuro:    Lungs:    CV:    ABD:     Ext: LLE splint in place CDI  0/5 str with numbness, likely from nerve block.    LABS   25 Apr 2018 06:18    135    |  99     |  16     ----------------------------<  107    4.0     |  23     |  0.58     Ca    9.5        25 Apr 2018 06:18                                   11.8   12.9  )-----------( 340      ( 25 Apr 2018 06:18 )             36.3                 [ ] Other Labs  [ ] None ordered            Please check or [place] when present:  •  Heart Failure:    [ ] Acute        [ ]  Acute on Chronic        [ ] Chronic         [ ] Diastolic     [ ]  Combined    •  MALCOLM:     [ ] ATN        [ ]  Renal medullary necrosis       [ ]  Renal cortical necrosis                  [ ] Other pathological Lesion:  •  CKD:  [ ] Stage I   [ ] Stage II  [ ] Stage III    [ ]Stage IV   [ ]  CKD V   [ ]  Other/Unspecified:    •  Abdominal Nutritional Status:   [ ] Malnutrition-See Nutrition note    [ ] Cachexia   [ ]  Other        [ ] Supplement ordered:            [ ] Morbid Obesity: BMI >=40         ASSESSMENT/PLAN:      STATUS POST: Left ankle ORIF POD1    CONTINUE:          [ ] PT NWB LLE    [ ] DVT PPX-ASA    [ ] Pain Mgt    [ ] Dispo plan-Pending PT evaluation.

## 2018-04-26 LAB
ANION GAP SERPL CALC-SCNC: 9 MMOL/L — SIGNIFICANT CHANGE UP (ref 5–17)
BUN SERPL-MCNC: 14 MG/DL — SIGNIFICANT CHANGE UP (ref 7–23)
CALCIUM SERPL-MCNC: 9.2 MG/DL — SIGNIFICANT CHANGE UP (ref 8.4–10.5)
CHLORIDE SERPL-SCNC: 100 MMOL/L — SIGNIFICANT CHANGE UP (ref 96–108)
CO2 SERPL-SCNC: 27 MMOL/L — SIGNIFICANT CHANGE UP (ref 22–31)
CREAT SERPL-MCNC: 0.69 MG/DL — SIGNIFICANT CHANGE UP (ref 0.5–1.3)
GLUCOSE SERPL-MCNC: 98 MG/DL — SIGNIFICANT CHANGE UP (ref 70–99)
HCT VFR BLD CALC: 33.6 % — LOW (ref 34.5–45)
HGB BLD-MCNC: 10.7 G/DL — LOW (ref 11.5–15.5)
MCHC RBC-ENTMCNC: 28.7 PG — SIGNIFICANT CHANGE UP (ref 27–34)
MCHC RBC-ENTMCNC: 31.8 G/DL — LOW (ref 32–36)
MCV RBC AUTO: 90.1 FL — SIGNIFICANT CHANGE UP (ref 80–100)
PLATELET # BLD AUTO: 364 K/UL — SIGNIFICANT CHANGE UP (ref 150–400)
POTASSIUM SERPL-MCNC: 4.1 MMOL/L — SIGNIFICANT CHANGE UP (ref 3.5–5.3)
POTASSIUM SERPL-SCNC: 4.1 MMOL/L — SIGNIFICANT CHANGE UP (ref 3.5–5.3)
RBC # BLD: 3.73 M/UL — LOW (ref 3.8–5.2)
RBC # FLD: 13.6 % — SIGNIFICANT CHANGE UP (ref 10.3–16.9)
SODIUM SERPL-SCNC: 136 MMOL/L — SIGNIFICANT CHANGE UP (ref 135–145)
WBC # BLD: 10.1 K/UL — SIGNIFICANT CHANGE UP (ref 3.8–10.5)
WBC # FLD AUTO: 10.1 K/UL — SIGNIFICANT CHANGE UP (ref 3.8–10.5)

## 2018-04-26 RX ORDER — DOCUSATE SODIUM 100 MG
1 CAPSULE ORAL
Qty: 0 | Refills: 0 | DISCHARGE
Start: 2018-04-26

## 2018-04-26 RX ORDER — OXYCODONE HYDROCHLORIDE 5 MG/1
1 TABLET ORAL
Qty: 0 | Refills: 0 | DISCHARGE
Start: 2018-04-26

## 2018-04-26 RX ORDER — PANTOPRAZOLE SODIUM 20 MG/1
1 TABLET, DELAYED RELEASE ORAL
Qty: 0 | Refills: 0 | DISCHARGE
Start: 2018-04-26

## 2018-04-26 RX ORDER — POLYETHYLENE GLYCOL 3350 17 G/17G
17 POWDER, FOR SOLUTION ORAL
Qty: 0 | Refills: 0 | DISCHARGE
Start: 2018-04-26

## 2018-04-26 RX ORDER — SENNA PLUS 8.6 MG/1
2 TABLET ORAL
Qty: 0 | Refills: 0 | DISCHARGE
Start: 2018-04-26

## 2018-04-26 RX ORDER — ASPIRIN/CALCIUM CARB/MAGNESIUM 324 MG
1 TABLET ORAL
Qty: 0 | Refills: 0 | DISCHARGE
Start: 2018-04-26

## 2018-04-26 RX ADMIN — Medication 975 MILLIGRAM(S): at 14:05

## 2018-04-26 RX ADMIN — Medication 975 MILLIGRAM(S): at 06:20

## 2018-04-26 RX ADMIN — Medication 325 MILLIGRAM(S): at 17:15

## 2018-04-26 RX ADMIN — Medication 500 MILLIGRAM(S): at 05:26

## 2018-04-26 RX ADMIN — Medication 100 MILLIGRAM(S): at 05:26

## 2018-04-26 RX ADMIN — OXYCODONE HYDROCHLORIDE 10 MILLIGRAM(S): 5 TABLET ORAL at 13:00

## 2018-04-26 RX ADMIN — Medication 500 MILLIGRAM(S): at 17:15

## 2018-04-26 RX ADMIN — Medication 1 TABLET(S): at 14:05

## 2018-04-26 RX ADMIN — Medication 100 MILLIGRAM(S): at 21:21

## 2018-04-26 RX ADMIN — OXYCODONE HYDROCHLORIDE 10 MILLIGRAM(S): 5 TABLET ORAL at 17:14

## 2018-04-26 RX ADMIN — Medication 975 MILLIGRAM(S): at 21:22

## 2018-04-26 RX ADMIN — OXYCODONE HYDROCHLORIDE 10 MILLIGRAM(S): 5 TABLET ORAL at 12:00

## 2018-04-26 RX ADMIN — Medication 975 MILLIGRAM(S): at 22:10

## 2018-04-26 RX ADMIN — OXYCODONE HYDROCHLORIDE 10 MILLIGRAM(S): 5 TABLET ORAL at 00:10

## 2018-04-26 RX ADMIN — PANTOPRAZOLE SODIUM 40 MILLIGRAM(S): 20 TABLET, DELAYED RELEASE ORAL at 11:25

## 2018-04-26 RX ADMIN — OXYCODONE HYDROCHLORIDE 10 MILLIGRAM(S): 5 TABLET ORAL at 08:11

## 2018-04-26 RX ADMIN — Medication 1 TABLET(S): at 05:25

## 2018-04-26 RX ADMIN — OXYCODONE HYDROCHLORIDE 10 MILLIGRAM(S): 5 TABLET ORAL at 04:25

## 2018-04-26 RX ADMIN — Medication 325 MILLIGRAM(S): at 07:37

## 2018-04-26 RX ADMIN — Medication 325 MILLIGRAM(S): at 05:26

## 2018-04-26 RX ADMIN — Medication 1 TABLET(S): at 21:20

## 2018-04-26 RX ADMIN — POLYETHYLENE GLYCOL 3350 17 GRAM(S): 17 POWDER, FOR SOLUTION ORAL at 11:25

## 2018-04-26 RX ADMIN — Medication 975 MILLIGRAM(S): at 15:05

## 2018-04-26 RX ADMIN — OXYCODONE HYDROCHLORIDE 10 MILLIGRAM(S): 5 TABLET ORAL at 07:37

## 2018-04-26 RX ADMIN — Medication 325 MILLIGRAM(S): at 11:58

## 2018-04-26 RX ADMIN — OXYCODONE HYDROCHLORIDE 10 MILLIGRAM(S): 5 TABLET ORAL at 18:14

## 2018-04-26 RX ADMIN — Medication 1 TABLET(S): at 11:25

## 2018-04-26 RX ADMIN — Medication 1 MILLIGRAM(S): at 11:25

## 2018-04-26 RX ADMIN — Medication 100 MILLIGRAM(S): at 14:05

## 2018-04-26 RX ADMIN — OXYCODONE HYDROCHLORIDE 10 MILLIGRAM(S): 5 TABLET ORAL at 03:37

## 2018-04-26 RX ADMIN — Medication 975 MILLIGRAM(S): at 05:26

## 2018-04-26 NOTE — PROGRESS NOTE ADULT - SUBJECTIVE AND OBJECTIVE BOX
No events    T(F): 97.7 (04-26-18 @ 07:00), Max: 98.7 (04-25-18 @ 20:26)  HR: 82 (04-26-18 @ 07:00) (74 - 82)  BP: 137/77 (04-26-18 @ 07:00) (98/62 - 137/77)  RR: 16 (04-26-18 @ 07:00) (16 - 16)  SpO2: 95% (04-26-18 @ 07:00) (95% - 98%)  Wt(kg): --    04-25 @ 07:01  -  04-26 @ 07:00  --------------------------------------------------------  IN:    Oral Fluid: 860 mL    sodium chloride 0.9%.: 1200 mL  Total IN: 2060 mL    OUT:    Voided: 1300 mL  Total OUT: 1300 mL    Total NET: 760 mL    PE: Splint CDI  Patient had block , unable to wiggle toes    A/P: 62y f s/p L ankle ORIF  POD#2  -Pain Control  -PT/NWB  -DVT PPX ASA  -Dispo Planning - [facility] No events    T(F): 97.7 (04-26-18 @ 07:00), Max: 98.7 (04-25-18 @ 20:26)  HR: 82 (04-26-18 @ 07:00) (74 - 82)  BP: 137/77 (04-26-18 @ 07:00) (98/62 - 137/77)  RR: 16 (04-26-18 @ 07:00) (16 - 16)  SpO2: 95% (04-26-18 @ 07:00) (95% - 98%)  Wt(kg): --    04-25 @ 07:01  -  04-26 @ 07:00  --------------------------------------------------------  IN:    Oral Fluid: 860 mL    sodium chloride 0.9%.: 1200 mL  Total IN: 2060 mL    OUT:    Voided: 1300 mL  Total OUT: 1300 mL    Total NET: 760 mL    PE: Splint CDI  SILT Toes  Fires EHL/FHL  Toes WWP    A/P: 62y f s/p L ankle ORIF  POD#2  -Pain Control  -PT/NWB  -DVT PPX ASA  -Dispo Planning - LUIS

## 2018-04-26 NOTE — PROGRESS NOTE ADULT - SUBJECTIVE AND OBJECTIVE BOX
ORTHO NOTE    [x ] Pt seen/examined.  [x ] Pt without any complaints/in NAD.    [ ] Pt complains of:      ROS: [ ] Fever  [ ] Chills  [ ] CP [ ] SOB [ ] Dysnea  [ ] Palpitations [ ] Cough [ ] N/V/C/D [ ] Paresthia [ ] Other     [ ] ROS  otherwise negative    .    PHYSICAL EXAM:    Vital Signs Last 24 Hrs  T(C): 36.1 (26 Apr 2018 08:15), Max: 37.1 (25 Apr 2018 20:26)  T(F): 96.9 (26 Apr 2018 08:15), Max: 98.7 (25 Apr 2018 20:26)  HR: 71 (26 Apr 2018 08:15) (71 - 82)  BP: 120/60 (26 Apr 2018 08:15) (98/62 - 137/77)  BP(mean): --  RR: 17 (26 Apr 2018 08:15) (16 - 17)  SpO2: 96% (26 Apr 2018 08:15) (95% - 98%)    I&O's Detail    25 Apr 2018 07:01  -  26 Apr 2018 07:00  --------------------------------------------------------  IN:    Oral Fluid: 860 mL    sodium chloride 0.9%: 1200 mL  Total IN: 2060 mL    OUT:    Voided: 1300 mL  Total OUT: 1300 mL    Total NET: 760 mL           CAPILLARY BLOOD GLUCOSE                      Neuro:    Lungs:    CV:    ABD:     Ext: Splint CDI.  5/5 FHL EHL  SILT WWP B/L LE    LABS   26 Apr 2018 07:48    136    |  100    |  14     ----------------------------<  98     4.1     |  27     |  0.69     Ca    9.2        26 Apr 2018 07:48                                   10.7   10.1  )-----------( 364      ( 26 Apr 2018 07:48 )             33.6                 [ ] Other Labs  [ ] None ordered            Please check or [place] when present:  •  Heart Failure:    [ ] Acute        [ ]  Acute on Chronic        [ ] Chronic         [ ] Diastolic     [ ]  Combined    •  MALCOLM:     [ ] ATN        [ ]  Renal medullary necrosis       [ ]  Renal cortical necrosis                  [ ] Other pathological Lesion:  •  CKD:  [ ] Stage I   [ ] Stage II  [ ] Stage III    [ ]Stage IV   [ ]  CKD V   [ ]  Other/Unspecified:    •  Abdominal Nutritional Status:   [ ] Malnutrition-See Nutrition note    [ ] Cachexia   [ ]  Other        [ ] Supplement ordered:            [ ] Morbid Obesity: BMI >=40         ASSESSMENT/PLAN:      STATUS POST: L ankle ORIF     CONTINUE:          [ ] PT    [ ] DVT PPX-ASA    [ ] Pain Mgt    [ ] Dispo plan-[facility]

## 2018-04-27 VITALS
RESPIRATION RATE: 16 BRPM | TEMPERATURE: 99 F | HEART RATE: 78 BPM | DIASTOLIC BLOOD PRESSURE: 66 MMHG | SYSTOLIC BLOOD PRESSURE: 102 MMHG | OXYGEN SATURATION: 96 %

## 2018-04-27 PROCEDURE — 93005 ELECTROCARDIOGRAM TRACING: CPT

## 2018-04-27 PROCEDURE — 85027 COMPLETE CBC AUTOMATED: CPT

## 2018-04-27 PROCEDURE — 86901 BLOOD TYPING SEROLOGIC RH(D): CPT

## 2018-04-27 PROCEDURE — 85025 COMPLETE CBC W/AUTO DIFF WBC: CPT

## 2018-04-27 PROCEDURE — 71045 X-RAY EXAM CHEST 1 VIEW: CPT

## 2018-04-27 PROCEDURE — 36415 COLL VENOUS BLD VENIPUNCTURE: CPT

## 2018-04-27 PROCEDURE — 85610 PROTHROMBIN TIME: CPT

## 2018-04-27 PROCEDURE — C1713: CPT

## 2018-04-27 PROCEDURE — 81003 URINALYSIS AUTO W/O SCOPE: CPT

## 2018-04-27 PROCEDURE — 86900 BLOOD TYPING SEROLOGIC ABO: CPT

## 2018-04-27 PROCEDURE — 80048 BASIC METABOLIC PNL TOTAL CA: CPT

## 2018-04-27 PROCEDURE — 85730 THROMBOPLASTIN TIME PARTIAL: CPT

## 2018-04-27 PROCEDURE — 76000 FLUOROSCOPY <1 HR PHYS/QHP: CPT

## 2018-04-27 PROCEDURE — 80053 COMPREHEN METABOLIC PANEL: CPT

## 2018-04-27 PROCEDURE — 86850 RBC ANTIBODY SCREEN: CPT

## 2018-04-27 PROCEDURE — 73610 X-RAY EXAM OF ANKLE: CPT

## 2018-04-27 PROCEDURE — C1889: CPT

## 2018-04-27 PROCEDURE — 73700 CT LOWER EXTREMITY W/O DYE: CPT

## 2018-04-27 PROCEDURE — 97161 PT EVAL LOW COMPLEX 20 MIN: CPT

## 2018-04-27 PROCEDURE — 97116 GAIT TRAINING THERAPY: CPT

## 2018-04-27 RX ADMIN — Medication 975 MILLIGRAM(S): at 07:17

## 2018-04-27 RX ADMIN — Medication 100 MILLIGRAM(S): at 07:17

## 2018-04-27 RX ADMIN — OXYCODONE HYDROCHLORIDE 10 MILLIGRAM(S): 5 TABLET ORAL at 13:31

## 2018-04-27 RX ADMIN — OXYCODONE HYDROCHLORIDE 10 MILLIGRAM(S): 5 TABLET ORAL at 03:05

## 2018-04-27 RX ADMIN — OXYCODONE HYDROCHLORIDE 10 MILLIGRAM(S): 5 TABLET ORAL at 09:31

## 2018-04-27 RX ADMIN — Medication 500 MILLIGRAM(S): at 07:16

## 2018-04-27 RX ADMIN — Medication 975 MILLIGRAM(S): at 13:32

## 2018-04-27 RX ADMIN — Medication 1 TABLET(S): at 07:16

## 2018-04-27 RX ADMIN — Medication 325 MILLIGRAM(S): at 07:16

## 2018-04-27 RX ADMIN — OXYCODONE HYDROCHLORIDE 10 MILLIGRAM(S): 5 TABLET ORAL at 10:30

## 2018-04-27 RX ADMIN — OXYCODONE HYDROCHLORIDE 10 MILLIGRAM(S): 5 TABLET ORAL at 13:33

## 2018-04-27 RX ADMIN — Medication 975 MILLIGRAM(S): at 08:10

## 2018-04-27 RX ADMIN — OXYCODONE HYDROCHLORIDE 10 MILLIGRAM(S): 5 TABLET ORAL at 03:50

## 2018-04-27 NOTE — PROGRESS NOTE ADULT - SUBJECTIVE AND OBJECTIVE BOX
No events    T(F): 97.9 (04-26-18 @ 20:55), Max: 98.9 (04-26-18 @ 16:05)  HR: 84 (04-26-18 @ 20:55) (71 - 84)  BP: 98/63 (04-26-18 @ 20:55) (98/63 - 120/60)  RR: 16 (04-26-18 @ 20:55) (1 - 17)  SpO2: 97% (04-26-18 @ 20:55) (96% - 99%)  Wt(kg): --    04-26 @ 07:01  -  04-27 @ 07:00  --------------------------------------------------------  IN:    Oral Fluid: 420 mL  Total IN: 420 mL    OUT:    Voided: 1250 mL  Total OUT: 1250 mL    Total NET: -830 mL    PE: Splint CDI  SILT Toes  Fires EHL/FHL  Toes WWP    A/P: 62y f s/p L ankle ORIF  POD#3  -Pain Control  -PT/NWB  -DVT PPX ASA  -Dispo Planning - [facility]IS

## 2018-05-01 DIAGNOSIS — S82.852A DISPLACED TRIMALLEOLAR FRACTURE OF LEFT LOWER LEG, INITIAL ENCOUNTER FOR CLOSED FRACTURE: ICD-10-CM
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
## 2018-05-01 DIAGNOSIS — K21.9 GASTRO-ESOPHAGEAL REFLUX DISEASE WITHOUT ESOPHAGITIS: ICD-10-CM

## 2018-05-01 DIAGNOSIS — S82.853A DISPLACED TRIMALLEOLAR FRACTURE OF UNSPECIFIED LOWER LEG, INITIAL ENCOUNTER FOR CLOSED FRACTURE: ICD-10-CM

## 2018-05-01 DIAGNOSIS — Z87.891 PERSONAL HISTORY OF NICOTINE DEPENDENCE: ICD-10-CM

## 2020-09-21 NOTE — DIETITIAN INITIAL EVALUATION ADULT. - FLUIDS
[facility]  Siva ATC follow-up note  Date of service performed: 9/18/20    Concern/injury: R Ankle    Assessment/plan: Rehab: 4 way ankle, S/L calf raise, S/L eyes closed balance, S/L line hops (fwd/back & side to side). No issues at practice.    Linda Millan, ATC       [phone]

## 2023-11-29 ENCOUNTER — APPOINTMENT (OUTPATIENT)
Dept: PULMONOLOGY | Facility: CLINIC | Age: 68
End: 2023-11-29
Payer: MEDICARE

## 2023-11-29 VITALS
SYSTOLIC BLOOD PRESSURE: 158 MMHG | HEART RATE: 103 BPM | OXYGEN SATURATION: 96 % | DIASTOLIC BLOOD PRESSURE: 80 MMHG | WEIGHT: 178 LBS | HEIGHT: 65 IN | BODY MASS INDEX: 29.66 KG/M2

## 2023-11-29 DIAGNOSIS — F17.200 NICOTINE DEPENDENCE, UNSPECIFIED, UNCOMPLICATED: ICD-10-CM

## 2023-11-29 PROBLEM — Z00.00 ENCOUNTER FOR PREVENTIVE HEALTH EXAMINATION: Status: ACTIVE | Noted: 2023-11-29

## 2023-11-29 PROCEDURE — 99406 BEHAV CHNG SMOKING 3-10 MIN: CPT

## 2023-11-29 PROCEDURE — 99203 OFFICE O/P NEW LOW 30 MIN: CPT | Mod: 25

## 2023-11-30 PROBLEM — F17.200 CURRENT EVERY DAY SMOKER: Status: ACTIVE | Noted: 2023-11-29

## 2023-12-06 ENCOUNTER — APPOINTMENT (OUTPATIENT)
Dept: PULMONOLOGY | Facility: CLINIC | Age: 68
End: 2023-12-06
Payer: MEDICARE

## 2023-12-06 PROCEDURE — 94727 GAS DIL/WSHOT DETER LNG VOL: CPT

## 2023-12-06 PROCEDURE — 94729 DIFFUSING CAPACITY: CPT

## 2023-12-06 PROCEDURE — 94060 EVALUATION OF WHEEZING: CPT

## 2023-12-06 RX ORDER — ALBUTEROL SULFATE 90 UG/1
108 (90 BASE) INHALANT RESPIRATORY (INHALATION)
Qty: 1 | Refills: 3 | Status: ACTIVE | COMMUNITY
Start: 2023-12-06 | End: 1900-01-01

## 2023-12-13 ENCOUNTER — APPOINTMENT (OUTPATIENT)
Dept: PULMONOLOGY | Facility: CLINIC | Age: 68
End: 2023-12-13
Payer: MEDICARE

## 2023-12-13 VITALS
BODY MASS INDEX: 29.32 KG/M2 | SYSTOLIC BLOOD PRESSURE: 130 MMHG | OXYGEN SATURATION: 98 % | HEIGHT: 65 IN | TEMPERATURE: 98 F | HEART RATE: 100 BPM | RESPIRATION RATE: 16 BRPM | WEIGHT: 176 LBS | DIASTOLIC BLOOD PRESSURE: 80 MMHG

## 2023-12-13 PROCEDURE — 99214 OFFICE O/P EST MOD 30 MIN: CPT

## 2023-12-13 NOTE — CONSULT LETTER
[Dear  ___] : Dear  [unfilled], [Courtesy Letter:] : I had the pleasure of seeing your patient, [unfilled], in my office today. [Please see my note below.] : Please see my note below. [Consult Closing:] : Thank you very much for allowing me to participate in the care of this patient.  If you have any questions, please do not hesitate to contact me. [Sincerely,] : Sincerely, [FreeTextEntry3] : KAREN ROSE MD  [address], [address] [place] [address]

## 2023-12-13 NOTE — REVIEW OF SYSTEMS
[Cough] : cough [Sputum] : sputum [Back Pain] : back pain [Chronic Pain] : chronic pain [Negative] : Endocrine

## 2023-12-13 NOTE — DISCUSSION/SUMMARY
[FreeTextEntry1] : 68-year-old female with abnormal chest CT findings with increased SUV on PET.  I reviewed the images online and discussed the findings with the patient.  The various diagnostic possibilities including malignancy were discussed. IR  CT-guided  lung biopsy will be performed.  Continued attempts to decrease cigarette use was stressed.  She is to follow-up with her PMD as before.

## 2023-12-13 NOTE — PHYSICAL EXAM
[No Acute Distress] : no acute distress [Normal Oropharynx] : normal oropharynx [Normal Appearance] : normal appearance [No Neck Mass] : no neck mass [Normal Rate/Rhythm] : normal rate/rhythm [Normal S1, S2] : normal s1, s2 [No Murmurs] : no murmurs [No Resp Distress] : no resp distress [Rhonchi] : rhonchi [Benign] : benign [Normal Gait] : normal gait [No Clubbing] : no clubbing [No Cyanosis] : no cyanosis [No Edema] : no edema [FROM] : FROM [Normal Color/ Pigmentation] : normal color/ pigmentation [No Focal Deficits] : no focal deficits [Oriented x3] : oriented x3 [Normal Affect] : normal affect [TextBox_80] : Left supraclavicular fullness

## 2023-12-13 NOTE — HISTORY OF PRESENT ILLNESS
[Current] : current [>= 20 pack years] : >= 20 pack years [TextBox_4] : 68-year-old female with abnormal chest CT presents for follow-up of PET/CT results.  Patient continues to complain of left shoulder and back pain with occasional productive cough.  She denies fever, chills, chest pain , night sweats or hemoptysis but continues to lose weight.  She continues to smoke but "less". [TextBox_11] : 3/4 [TextBox_13] : 40 [TextBox_29] : Denies snoring, daytime somnolence, apneic episodes, AM headaches

## 2023-12-15 LAB
ANION GAP SERPL CALC-SCNC: 10 MMOL/L
APTT BLD: 34.2 SEC
BASOPHILS # BLD AUTO: 0.07 K/UL
BASOPHILS NFR BLD AUTO: 0.6 %
BUN SERPL-MCNC: 20 MG/DL
CALCIUM SERPL-MCNC: 9.8 MG/DL
CHLORIDE SERPL-SCNC: 103 MMOL/L
CO2 SERPL-SCNC: 28 MMOL/L
CREAT SERPL-MCNC: 0.69 MG/DL
EGFR: 93 ML/MIN/1.73M2
EOSINOPHIL # BLD AUTO: 0.13 K/UL
EOSINOPHIL NFR BLD AUTO: 1.1 %
GLUCOSE SERPL-MCNC: 115 MG/DL
HCT VFR BLD CALC: 40.4 %
HGB BLD-MCNC: 13.4 G/DL
IMM GRANULOCYTES NFR BLD AUTO: 0.3 %
LYMPHOCYTES # BLD AUTO: 2.42 K/UL
LYMPHOCYTES NFR BLD AUTO: 20.6 %
MAN DIFF?: NORMAL
MCHC RBC-ENTMCNC: 29.9 PG
MCHC RBC-ENTMCNC: 33.2 GM/DL
MCV RBC AUTO: 90.2 FL
MONOCYTES # BLD AUTO: 0.82 K/UL
MONOCYTES NFR BLD AUTO: 7 %
NEUTROPHILS # BLD AUTO: 8.24 K/UL
NEUTROPHILS NFR BLD AUTO: 70.4 %
PLATELET # BLD AUTO: 403 K/UL
POTASSIUM SERPL-SCNC: 4.7 MMOL/L
RBC # BLD: 4.48 M/UL
RBC # FLD: 14.4 %
SODIUM SERPL-SCNC: 141 MMOL/L
WBC # FLD AUTO: 11.72 K/UL

## 2024-01-10 ENCOUNTER — APPOINTMENT (OUTPATIENT)
Dept: PULMONOLOGY | Facility: CLINIC | Age: 69
End: 2024-01-10
Payer: MEDICARE

## 2024-01-10 VITALS
HEART RATE: 98 BPM | WEIGHT: 172 LBS | SYSTOLIC BLOOD PRESSURE: 156 MMHG | DIASTOLIC BLOOD PRESSURE: 80 MMHG | BODY MASS INDEX: 28.66 KG/M2 | OXYGEN SATURATION: 95 % | HEIGHT: 65 IN

## 2024-01-10 DIAGNOSIS — M79.18 MYALGIA, OTHER SITE: ICD-10-CM

## 2024-01-10 PROCEDURE — 99214 OFFICE O/P EST MOD 30 MIN: CPT

## 2024-01-10 NOTE — DISCUSSION/SUMMARY
[FreeTextEntry1] : 68-year-old female with squamous cell lung carcinoma, at the least IIB disease. The path findings were discussed with the patient and the various treatment options were discussed also.  Evaluation by medical oncologist was recommended.  She was referred to Dr. PATRICK Melendez, who was contacted. Follow-up with her PMD was recommended.

## 2024-01-10 NOTE — CONSULT LETTER
[Dear  ___] : Dear  [unfilled], [Courtesy Letter:] : I had the pleasure of seeing your patient, [unfilled], in my office today. [Please see my note below.] : Please see my note below. [Consult Closing:] : Thank you very much for allowing me to participate in the care of this patient.  If you have any questions, please do not hesitate to contact me. [Sincerely,] : Sincerely, [FreeTextEntry3] : KAREN ROSE MD  [address], [address] [place] [address]

## 2024-01-10 NOTE — HISTORY OF PRESENT ILLNESS
[Former] : former [>= 20 pack years] : >= 20 pack years [TextBox_4] : 68-year-old female with abnormal chest CT, recently diagnosed with squamous cell carcinoma of the lung by IR CT-guided biopsy, presents for follow-up of the path results.  The patient continues to complain of right shoulder discomfort with numbness.  She also continues to have cough, occasionally productive without fever, chills, hemoptysis or night sweats.  She has lost nearly 40 pounds over the last 6 months. [TextBox_11] : 3/4 [TextBox_13] : 40 [TextBox_29] : Denies snoring, daytime somnolence, apneic episodes, AM headaches

## 2024-03-28 ENCOUNTER — APPOINTMENT (OUTPATIENT)
Dept: THORACIC SURGERY | Facility: CLINIC | Age: 69
End: 2024-03-28
Payer: MEDICARE

## 2024-03-28 VITALS
HEIGHT: 65 IN | OXYGEN SATURATION: 100 % | WEIGHT: 176 LBS | BODY MASS INDEX: 29.32 KG/M2 | HEART RATE: 75 BPM | RESPIRATION RATE: 17 BRPM | DIASTOLIC BLOOD PRESSURE: 73 MMHG | SYSTOLIC BLOOD PRESSURE: 147 MMHG

## 2024-03-28 DIAGNOSIS — R91.8 OTHER NONSPECIFIC ABNORMAL FINDING OF LUNG FIELD: ICD-10-CM

## 2024-03-28 PROCEDURE — 99205 OFFICE O/P NEW HI 60 MIN: CPT

## 2024-03-28 NOTE — CONSULT LETTER
[Consult Letter:] : I had the pleasure of evaluating your patient, [unfilled]. [( Thank you for referring [unfilled] for consultation for _____ )] : Thank you for referring [unfilled] for consultation for [unfilled] [Please see my note below.] : Please see my note below. [Consult Closing:] : Thank you very much for allowing me to participate in the care of this patient.  If you have any questions, please do not hesitate to contact me. [Sincerely,] : Sincerely, [Dear  ___] : Dear  [unfilled], [FreeTextEntry2] : Dr. Vineet Melendez (Hem/Onc/Referring) Dr. Cheng Salcedo (Pulm [FreeTextEntry3] : Maurice Guan MD, MPH  System Director of Thoracic Surgery  Director of Comprehensive Lung and Foregut [place]  Professor Cardiovascular & Thoracic Surgery   [facility] [employer] [facility] [id number] [place] [address] [place] [address] Tel: [phone] Fax: [phone]

## 2024-03-28 NOTE — HISTORY OF PRESENT ILLNESS
[FreeTextEntry1] : Ms. GODFREY ALVAREZ, 68 year old female, former smoker (1PPD x 50 years; Quit Jan 2024), w/ hx of COPD. Late 2023, was experiencing left sided rib pain that was progressive over 2 months as well as weight loss and a more porductive cough with dark sputum. November, 2023 CT Chest revealing a SAMRA cavitary mass with adjacent rib destruction ans well as a 4 mm RLL nodule. Subsequent PET/CT in December demonstrating avidity to SAMRA mass. RLL below resolution of PET. 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 Now, s/p 3 cycles of neoadjuvant treatment with cisplatin, paclitaxel and nivolumab. Referred by Hem/Onc Dr. Vineet Melendez.  PFTs on 12/6/23: FVC 83%, FEV1 56%, DLCO 69%.  PET/CT on 3/13/24 at [facility]: - 4.2 x 4 x 3.1cm SUV=13 partially necrotic Lt apical mass, invading the inferior Lt 1st and 2nd ribs, w/ extension to the supraclavicular fossa (previously 4.1 x 3.7 x 3cm), c/w malignancy - no mediastinal lymphadenopathy - Lt hilar with SUV max 2.7, likely reactive or benign - pathologic fracture of Lt 1st and possibly 2nd ribs - decreasing size 4mm RLL nodule (2: 106; previously 6mm) - small hiatal hernia - calcified density in the breasts  Of note: C6-C7 injury. Follows with Ortho - Dr. Guille Barger  Patient is here today for CT Sx consultation. + Left arm tingling and left rib pain. Today, patient denies worsening SOB, chest pain, cough, hemoptysis, fever, chills, night sweats, lightheadedness or dizziness.

## 2024-03-28 NOTE — DATA REVIEWED
[FreeTextEntry1] : I have independently reviewed the following: PFTs on 12/6/23: FVC 83%, FEV1 56%, DLCO 69%. PET/CT on 3/13/24 at [facility]

## 2024-03-28 NOTE — ASSESSMENT
[FreeTextEntry1] : Ms. GODFREY ALVAREZ, 68 year old female, former smoker (1PPD x 50 years; Quit Jan 2024), w/ hx of COPD. Late 2023, was experiencing left sided rib pain that was progressive over 2 months as well as weight loss and a more porductive cough with dark sputum. November, 2023 CT Chest revealing a SAMRA cavitary mass with adjacent rib destruction ans well as a 4 mm RLL nodule. Subsequent PET/CT in December demonstrating avidity to SAMRA mass. RLL below resolution of PET. 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 Now, s/p 3 cycles of neoadjuvant treatment with cisplatin, paclitaxel and nivolumab. Referred by Hem/Onc Dr. Vineet Melendez.  I have reviewed the patient's medical records and diagnostic images at time of this office consultation and have made the following recommendation: 1. Bronch Meds, Left Thoracotomy, Resection of Pancoast Tumor, Left upper Lobectomy and chest wall reconstruction w/ Epidural. Risks, benefits and alternatives explained to patient; All questions answered and patient agrees to proceed with surgery. Will book for 4/23.  2. Following needed prior to procedure:  - Ortho clearance (4/15) - Medical clearance  - Cardiac clearance with stress test - CT Chest with IV contrast to evaluate surrounding vasculature - MRI of left brachial plexus to evaluate for plexus involvement - VQ scan  Recommendations reviewed with patient during this office visit, and all questions answered; Patient instructed on the importance of follow up and verbalizes understanding.  I, Dr Olena Guan , personally performed the evaluation and management (E/M) services for this new patient. That E/M includes conducting the initial examination, assessing all conditions, and establishing the plan of care. Today, My ACP, Althea Gonzalez, was here to observe my evaluation and management services for this patient to be followed going forward.

## 2024-03-28 NOTE — PHYSICAL EXAM
[General Appearance - Alert] : alert [General Appearance - In No Acute Distress] : in no acute distress [General Appearance - Well Nourished] : well nourished [Sclera] : the sclera and conjunctiva were normal [Extraocular Movements] : extraocular movements were intact [Outer Ear] : the ears and nose were normal in appearance [Neck Appearance] : the appearance of the neck was normal [Neck Cervical Mass (___cm)] : no neck mass was observed [] : no respiratory distress [Respiration, Rhythm And Depth] : normal respiratory rhythm and effort [Exaggerated Use Of Accessory Muscles For Inspiration] : no accessory muscle use [Auscultation Breath Sounds / Voice Sounds] : lungs were clear to auscultation bilaterally [Examination Of The Chest] : the chest was normal in appearance [Heart Rate And Rhythm] : heart rate was normal and rhythm regular [Chest Visual Inspection Thoracic Asymmetry] : no chest asymmetry [Diminished Respiratory Excursion] : normal chest expansion [2+] : left 2+ [Breast Appearance] : normal in appearance [Breast Palpation Mass] : no palpable masses [Bowel Sounds] : normal bowel sounds [Abdomen Soft] : soft [Abdomen Tenderness] : non-tender [Cervical Lymph Nodes Enlarged Posterior Bilaterally] : posterior cervical [Cervical Lymph Nodes Enlarged Anterior Bilaterally] : anterior cervical [Supraclavicular Lymph Nodes Enlarged Bilaterally] : supraclavicular [No CVA Tenderness] : no ~M costovertebral angle tenderness [No Spinal Tenderness] : no spinal tenderness [Involuntary Movements] : no involuntary movements were seen [Skin Color & Pigmentation] : normal skin color and pigmentation [No Focal Deficits] : no focal deficits [Oriented To Time, Place, And Person] : oriented to person, place, and time [FreeTextEntry1] : Deferred

## 2024-04-02 DIAGNOSIS — Z01.818 ENCOUNTER FOR OTHER PREPROCEDURAL EXAMINATION: ICD-10-CM

## 2024-04-04 ENCOUNTER — APPOINTMENT (OUTPATIENT)
Dept: NUCLEAR MEDICINE | Facility: HOSPITAL | Age: 69
End: 2024-04-04
Payer: MEDICARE

## 2024-04-04 ENCOUNTER — OUTPATIENT (OUTPATIENT)
Dept: OUTPATIENT SERVICES | Facility: HOSPITAL | Age: 69
LOS: 1 days | End: 2024-04-04

## 2024-04-04 DIAGNOSIS — R91.8 OTHER NONSPECIFIC ABNORMAL FINDING OF LUNG FIELD: ICD-10-CM

## 2024-04-04 PROCEDURE — 78597 LUNG PERFUSION DIFFERENTIAL: CPT | Mod: 26

## 2024-04-12 DIAGNOSIS — C34.90 MALIGNANT NEOPLASM OF UNSPECIFIED PART OF UNSPECIFIED BRONCHUS OR LUNG: ICD-10-CM

## 2024-04-12 DIAGNOSIS — Z87.891 PERSONAL HISTORY OF NICOTINE DEPENDENCE: ICD-10-CM

## 2024-04-12 DIAGNOSIS — Z78.9 OTHER SPECIFIED HEALTH STATUS: ICD-10-CM

## 2024-04-12 DIAGNOSIS — Z82.3 FAMILY HISTORY OF STROKE: ICD-10-CM

## 2024-04-12 RX ORDER — ASPIRIN 81 MG
81 TABLET,CHEWABLE ORAL
Refills: 0 | Status: DISCONTINUED | COMMUNITY
End: 2024-04-12

## 2024-04-12 RX ORDER — CYCLOBENZAPRINE HYDROCHLORIDE 10 MG/1
10 TABLET, FILM COATED ORAL 3 TIMES DAILY
Qty: 30 | Refills: 0 | Status: DISCONTINUED | COMMUNITY
Start: 2024-01-10 | End: 2024-04-12

## 2024-04-13 ENCOUNTER — OUTPATIENT (OUTPATIENT)
Dept: OUTPATIENT SERVICES | Facility: HOSPITAL | Age: 69
LOS: 1 days | End: 2024-04-13
Payer: MEDICARE

## 2024-04-13 ENCOUNTER — APPOINTMENT (OUTPATIENT)
Dept: CT IMAGING | Facility: CLINIC | Age: 69
End: 2024-04-13
Payer: MEDICARE

## 2024-04-13 ENCOUNTER — APPOINTMENT (OUTPATIENT)
Dept: MRI IMAGING | Facility: CLINIC | Age: 69
End: 2024-04-13
Payer: MEDICARE

## 2024-04-13 DIAGNOSIS — R91.8 OTHER NONSPECIFIC ABNORMAL FINDING OF LUNG FIELD: ICD-10-CM

## 2024-04-13 PROCEDURE — 71552 MRI CHEST W/O & W/DYE: CPT | Mod: 26,MH

## 2024-04-13 PROCEDURE — 71552 MRI CHEST W/O & W/DYE: CPT

## 2024-04-13 PROCEDURE — 71260 CT THORAX DX C+: CPT | Mod: 26,MH

## 2024-04-13 PROCEDURE — A9585: CPT

## 2024-04-13 PROCEDURE — 71260 CT THORAX DX C+: CPT

## 2024-04-15 ENCOUNTER — APPOINTMENT (OUTPATIENT)
Dept: CV DIAGNOSTICS | Facility: HOSPITAL | Age: 69
End: 2024-04-15

## 2024-04-16 ENCOUNTER — OUTPATIENT (OUTPATIENT)
Dept: OUTPATIENT SERVICES | Facility: HOSPITAL | Age: 69
LOS: 1 days | End: 2024-04-16
Payer: MEDICARE

## 2024-04-16 ENCOUNTER — RESULT REVIEW (OUTPATIENT)
Age: 69
End: 2024-04-16

## 2024-04-16 ENCOUNTER — APPOINTMENT (OUTPATIENT)
Dept: CV DIAGNOSTICS | Facility: HOSPITAL | Age: 69
End: 2024-04-16

## 2024-04-16 DIAGNOSIS — Z01.818 ENCOUNTER FOR OTHER PREPROCEDURAL EXAMINATION: ICD-10-CM

## 2024-04-16 DIAGNOSIS — R91.8 OTHER NONSPECIFIC ABNORMAL FINDING OF LUNG FIELD: ICD-10-CM

## 2024-04-16 PROCEDURE — 93018 CV STRESS TEST I&R ONLY: CPT | Mod: GC,MC

## 2024-04-16 PROCEDURE — 93016 CV STRESS TEST SUPVJ ONLY: CPT | Mod: GC,MC

## 2024-04-16 PROCEDURE — 78452 HT MUSCLE IMAGE SPECT MULT: CPT | Mod: 26,MC

## 2024-04-17 ENCOUNTER — OUTPATIENT (OUTPATIENT)
Dept: OUTPATIENT SERVICES | Facility: HOSPITAL | Age: 69
LOS: 1 days | End: 2024-04-17

## 2024-04-17 ENCOUNTER — NON-APPOINTMENT (OUTPATIENT)
Age: 69
End: 2024-04-17

## 2024-04-17 ENCOUNTER — APPOINTMENT (OUTPATIENT)
Dept: CARDIOLOGY | Facility: CLINIC | Age: 69
End: 2024-04-17
Payer: MEDICARE

## 2024-04-17 VITALS
DIASTOLIC BLOOD PRESSURE: 84 MMHG | WEIGHT: 176 LBS | HEART RATE: 82 BPM | BODY MASS INDEX: 29.32 KG/M2 | HEIGHT: 65 IN | OXYGEN SATURATION: 95 % | SYSTOLIC BLOOD PRESSURE: 146 MMHG

## 2024-04-17 VITALS
TEMPERATURE: 98 F | RESPIRATION RATE: 18 BRPM | WEIGHT: 169.98 LBS | DIASTOLIC BLOOD PRESSURE: 81 MMHG | HEART RATE: 97 BPM | HEIGHT: 63 IN | SYSTOLIC BLOOD PRESSURE: 129 MMHG | OXYGEN SATURATION: 98 %

## 2024-04-17 DIAGNOSIS — R91.8 OTHER NONSPECIFIC ABNORMAL FINDING OF LUNG FIELD: ICD-10-CM

## 2024-04-17 DIAGNOSIS — Z01.810 ENCOUNTER FOR PREPROCEDURAL CARDIOVASCULAR EXAMINATION: ICD-10-CM

## 2024-04-17 DIAGNOSIS — Z87.81 PERSONAL HISTORY OF (HEALED) TRAUMATIC FRACTURE: Chronic | ICD-10-CM

## 2024-04-17 DIAGNOSIS — Z87.898 PERSONAL HISTORY OF OTHER SPECIFIED CONDITIONS: ICD-10-CM

## 2024-04-17 DIAGNOSIS — R06.09 OTHER FORMS OF DYSPNEA: ICD-10-CM

## 2024-04-17 DIAGNOSIS — Z98.890 OTHER SPECIFIED POSTPROCEDURAL STATES: Chronic | ICD-10-CM

## 2024-04-17 DIAGNOSIS — Z13.6 ENCOUNTER FOR SCREENING FOR CARDIOVASCULAR DISORDERS: ICD-10-CM

## 2024-04-17 LAB
ALBUMIN SERPL ELPH-MCNC: 4.2 G/DL — SIGNIFICANT CHANGE UP (ref 3.3–5)
ALP SERPL-CCNC: 98 U/L — SIGNIFICANT CHANGE UP (ref 40–120)
ALT FLD-CCNC: 16 U/L — SIGNIFICANT CHANGE UP (ref 4–33)
ANION GAP SERPL CALC-SCNC: 13 MMOL/L — SIGNIFICANT CHANGE UP (ref 7–14)
AST SERPL-CCNC: 14 U/L — SIGNIFICANT CHANGE UP (ref 4–32)
BILIRUB SERPL-MCNC: <0.2 MG/DL — SIGNIFICANT CHANGE UP (ref 0.2–1.2)
BLD GP AB SCN SERPL QL: NEGATIVE — SIGNIFICANT CHANGE UP
BUN SERPL-MCNC: 22 MG/DL — SIGNIFICANT CHANGE UP (ref 7–23)
CALCIUM SERPL-MCNC: 9.7 MG/DL — SIGNIFICANT CHANGE UP (ref 8.4–10.5)
CHLORIDE SERPL-SCNC: 103 MMOL/L — SIGNIFICANT CHANGE UP (ref 98–107)
CO2 SERPL-SCNC: 28 MMOL/L — SIGNIFICANT CHANGE UP (ref 22–31)
CREAT SERPL-MCNC: 0.77 MG/DL — SIGNIFICANT CHANGE UP (ref 0.5–1.3)
EGFR: 83 ML/MIN/1.73M2 — SIGNIFICANT CHANGE UP
GLUCOSE SERPL-MCNC: 132 MG/DL — HIGH (ref 70–99)
HCT VFR BLD CALC: 34.3 % — LOW (ref 34.5–45)
HGB BLD-MCNC: 10.8 G/DL — LOW (ref 11.5–15.5)
MCHC RBC-ENTMCNC: 28.6 PG — SIGNIFICANT CHANGE UP (ref 27–34)
MCHC RBC-ENTMCNC: 31.5 GM/DL — LOW (ref 32–36)
MCV RBC AUTO: 90.7 FL — SIGNIFICANT CHANGE UP (ref 80–100)
NRBC # BLD: 0 /100 WBCS — SIGNIFICANT CHANGE UP (ref 0–0)
NRBC # FLD: 0 K/UL — SIGNIFICANT CHANGE UP (ref 0–0)
PLATELET # BLD AUTO: 449 K/UL — HIGH (ref 150–400)
POTASSIUM SERPL-MCNC: 3.8 MMOL/L — SIGNIFICANT CHANGE UP (ref 3.5–5.3)
POTASSIUM SERPL-SCNC: 3.8 MMOL/L — SIGNIFICANT CHANGE UP (ref 3.5–5.3)
PROT SERPL-MCNC: 7.4 G/DL — SIGNIFICANT CHANGE UP (ref 6–8.3)
RBC # BLD: 3.78 M/UL — LOW (ref 3.8–5.2)
RBC # FLD: 14.9 % — HIGH (ref 10.3–14.5)
RH IG SCN BLD-IMP: POSITIVE — SIGNIFICANT CHANGE UP
RH IG SCN BLD-IMP: POSITIVE — SIGNIFICANT CHANGE UP
SODIUM SERPL-SCNC: 144 MMOL/L — SIGNIFICANT CHANGE UP (ref 135–145)
WBC # BLD: 10.58 K/UL — HIGH (ref 3.8–10.5)
WBC # FLD AUTO: 10.58 K/UL — HIGH (ref 3.8–10.5)

## 2024-04-17 PROCEDURE — 99204 OFFICE O/P NEW MOD 45 MIN: CPT

## 2024-04-17 PROCEDURE — 93000 ELECTROCARDIOGRAM COMPLETE: CPT

## 2024-04-17 PROCEDURE — G2211 COMPLEX E/M VISIT ADD ON: CPT

## 2024-04-17 RX ORDER — SODIUM CHLORIDE 9 MG/ML
1000 INJECTION, SOLUTION INTRAVENOUS
Refills: 0 | Status: DISCONTINUED | OUTPATIENT
Start: 2024-04-23 | End: 2024-04-27

## 2024-04-17 NOTE — H&P PST ADULT - NEUROLOGICAL
normal/sensation intact/responds to pain/responds to verbal commands/cranial nerves intact/no spontaneous movement negative

## 2024-04-17 NOTE — H&P PST ADULT - NSICDXPASTMEDICALHX_GEN_ALL_CORE_FT
PAST MEDICAL HISTORY:  H/O fracture of ankle     History of COPD     Seasonal allergies      PAST MEDICAL HISTORY:  H/O fracture of ankle     History of COPD     Other nonspecific abnormal finding of lung field     Seasonal allergies

## 2024-04-17 NOTE — H&P PST ADULT - PROBLEM SELECTOR PLAN 1
Patient tentatively scheduled for Bronchoscopy, Mediastinoscopy, left thoracotomy, resection of pancoast tumor with left upper lobe lobectomy and chest wall reconstruction with epidural on 4/23/24.  Pre-op instructions provided. Pt given verbal and written instructions with teach back on chlorhexidine shampoo and pepcid. Pt verbalized understanding with return demonstration.     CBC, CMP, T&S/ABO were done at PST.  Copy of EKG, Echocardiogram requested.  Cardiology evaluation with nuclear stress test results from allscripts in chart.  Patient had Ortho evaluation requested by Surgeon.

## 2024-04-17 NOTE — H&P PST ADULT - NEGATIVE ENMT SYMPTOMS
Partial dentures. Denies loose teeth./no hearing difficulty/no ear pain/no tinnitus/no vertigo/no sinus symptoms/no dry mouth/no throat pain/no dysphagia

## 2024-04-17 NOTE — H&P PST ADULT - HISTORY OF PRESENT ILLNESS
68 year old female with pmhx of COPD, Former smoker (1PPD x 50 years; Quit Jan 2024), November 2023 CT Chest revealing a SAMRA cavitary mass with adjacent rib destruction ans well as a 4 mm RLL nodule. 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 PET/CT (12/2023) showed SAMRA mass. Pt received 3 cycles of neoadjuvant treatment with cisplatin, paclitaxel and nivolumab. Followed by Oncologist Dr. Vineet Melendez.  Patient presents for pre-op evaluations for diagnosis of   Denies cp, palpitations, dizziness, fevers, n/v/d/c. Patient has post nasal drip cough due to Seasonal allergies.  68 year old female with pmhx of COPD, Former smoker (1PPD x 50 years; Quit Jan 2024), November 2023 CT Chest revealing a SAMRA cavitary mass with adjacent rib destruction ans well as a 4 mm RLL nodule. 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 PET/CT (12/2023) showed SAMRA mass. Pt received 3 cycles of neoadjuvant treatment with cisplatin, paclitaxel and nivolumab. Followed by Oncologist Dr. Vineet Melendez.  Patient presents for pre-op evaluations for diagnosis of Other nonspecific abnormal finding of lung field. Patient is scheduled for Bronchoscopy, Mediastinoscopy, left thoracotomy, resection of pancoast tumor with left upper lobe lobectomy and chest wall reconstruction with epidural.   Denies cp, palpitations, dizziness, fevers, n/v/d/c. Patient has post nasal drip cough due to Seasonal allergies.   Patient had a fall 6 weeks ago and sustained a cervical fracture. Follows with Ortho Spine specialist in [facility] in [place], Dr Barger.

## 2024-04-17 NOTE — H&P PST ADULT - NSANTHOSAYNRD_GEN_A_CORE
No. MINESH screening performed.  STOP BANG Legend: 0-2 = LOW Risk; 3-4 = INTERMEDIATE Risk; 5-8 = HIGH Risk

## 2024-04-17 NOTE — H&P PST ADULT - LAST STRESS TEST
4/26/24- In allscripts- EF 65%, Normal LV function, small-sized, mild to moderate defect(s) in the inferior and inferolateral wall that is predominantly fixed suggestive of diaphragmatic attenuation artifact.

## 2024-04-17 NOTE — H&P PST ADULT - GASTROINTESTINAL
details… normal/soft/nontender/nondistended/normal active bowel sounds/no guarding/no rigidity/no palpable anjelica

## 2024-04-17 NOTE — H&P PST ADULT - MS GEN HX ROS MEA POS PC
S/p fall and sustained Cervical fracture C6-C-7, wears neck brace and followed by Spine surgery./arthralgia/arthritis/neck pain

## 2024-04-19 ENCOUNTER — RESULT REVIEW (OUTPATIENT)
Age: 69
End: 2024-04-19

## 2024-04-19 ENCOUNTER — OUTPATIENT (OUTPATIENT)
Dept: OUTPATIENT SERVICES | Facility: HOSPITAL | Age: 69
LOS: 1 days | End: 2024-04-19
Payer: MEDICARE

## 2024-04-19 ENCOUNTER — APPOINTMENT (OUTPATIENT)
Dept: CV DIAGNOSITCS | Facility: HOSPITAL | Age: 69
End: 2024-04-19

## 2024-04-19 DIAGNOSIS — Z98.890 OTHER SPECIFIED POSTPROCEDURAL STATES: Chronic | ICD-10-CM

## 2024-04-19 DIAGNOSIS — R06.09 OTHER FORMS OF DYSPNEA: ICD-10-CM

## 2024-04-19 PROBLEM — J30.2 OTHER SEASONAL ALLERGIC RHINITIS: Chronic | Status: ACTIVE | Noted: 2024-04-17

## 2024-04-19 PROBLEM — R91.8 OTHER NONSPECIFIC ABNORMAL FINDING OF LUNG FIELD: Chronic | Status: ACTIVE | Noted: 2024-04-17

## 2024-04-19 PROBLEM — Z87.81 PERSONAL HISTORY OF (HEALED) TRAUMATIC FRACTURE: Chronic | Status: ACTIVE | Noted: 2024-04-17

## 2024-04-19 PROBLEM — Z87.09 PERSONAL HISTORY OF OTHER DISEASES OF THE RESPIRATORY SYSTEM: Chronic | Status: ACTIVE | Noted: 2024-04-17

## 2024-04-19 PROCEDURE — 93356 MYOCRD STRAIN IMG SPCKL TRCK: CPT

## 2024-04-19 PROCEDURE — 76376 3D RENDER W/INTRP POSTPROCES: CPT | Mod: 26

## 2024-04-19 PROCEDURE — 93306 TTE W/DOPPLER COMPLETE: CPT | Mod: 26

## 2024-04-22 ENCOUNTER — TRANSCRIPTION ENCOUNTER (OUTPATIENT)
Age: 69
End: 2024-04-22

## 2024-04-22 ENCOUNTER — NON-APPOINTMENT (OUTPATIENT)
Age: 69
End: 2024-04-22

## 2024-04-22 NOTE — ASU PATIENT PROFILE, ADULT - NSICDXPASTMEDICALHX_GEN_ALL_CORE_FT
PAST MEDICAL HISTORY:  H/O fracture of ankle     History of COPD     Other nonspecific abnormal finding of lung field     Seasonal allergies

## 2024-04-22 NOTE — ASU PATIENT PROFILE, ADULT - FALL HARM RISK - HARM RISK INTERVENTIONS
Assistance with ambulation/Assistance OOB with selected safe patient handling equipment/Communicate Risk of Fall with Harm to all staff/Discuss with provider need for PT consult/Monitor gait and stability/Provide patient with walking aids - walker, cane, crutches/Reinforce activity limits and safety measures with patient and family/Sit up slowly, dangle for a short time, stand at bedside before walking/Tailored Fall Risk Interventions/Visual Cue: Yellow wristband and red socks/Bed in lowest position, wheels locked, appropriate side rails in place/Call bell, personal items and telephone in reach/Instruct patient to call for assistance before getting out of bed or chair/Non-slip footwear when patient is out of bed/[place] to call system/Physically safe environment - no spills, clutter or unnecessary equipment/Purposeful Proactive Rounding/Room/bathroom lighting operational, light cord in reach

## 2024-04-22 NOTE — ASU PATIENT PROFILE, ADULT - ABILITY TO HEAR (WITH HEARING AID OR HEARING APPLIANCE IF NORMALLY USED):
Problem: Fall Injury Risk  Goal: Absence of Fall and Fall-Related Injury  Outcome: Ongoing, Progressing  Intervention: Promote Injury-Free Environment  Recent Flowsheet Documentation  Taken 8/4/2023 2136 by China Morillo RN  Safety Promotion/Fall Prevention: safety round/check completed  Taken 8/4/2023 1930 by China Morillo RN  Safety Promotion/Fall Prevention: safety round/check completed  Goal: Absence of Fall and Fall-Related Injury  Outcome: Ongoing, Progressing  Intervention: Promote Injury-Free Environment  Recent Flowsheet Documentation  Taken 8/4/2023 2136 by China Morillo RN  Safety Promotion/Fall Prevention: safety round/check completed  Taken 8/4/2023 1930 by China Morillo RN  Safety Promotion/Fall Prevention: safety round/check completed     Problem: Adjustment to Illness (Sepsis/Septic Shock)  Goal: Optimal Coping  Outcome: Ongoing, Progressing     Problem: Bleeding (Sepsis/Septic Shock)  Goal: Absence of Bleeding  Outcome: Ongoing, Progressing     Problem: Glycemic Control Impaired (Sepsis/Septic Shock)  Goal: Blood Glucose Level Within Desired Range  Outcome: Ongoing, Progressing     Problem: Infection Progression (Sepsis/Septic Shock)  Goal: Absence of Infection Signs and Symptoms  Outcome: Ongoing, Progressing  Intervention: Initiate Sepsis Management  Recent Flowsheet Documentation  Taken 8/4/2023 2136 by China Morillo RN  Isolation Precautions:   contact   spore   precautions maintained     Problem: Nutrition Impaired (Sepsis/Septic Shock)  Goal: Optimal Nutrition Intake  Outcome: Ongoing, Progressing     Problem: Skin Injury Risk Increased  Goal: Skin Health and Integrity  Outcome: Ongoing, Progressing  Intervention: Optimize Skin Protection  Recent Flowsheet Documentation  Taken 8/4/2023 2136 by China Morillo RN  Head of Bed (HOB) Positioning: HOB elevated     Problem: Adult Inpatient Plan of Care  Goal: Plan of Care Review  Outcome: Ongoing, Progressing  Flowsheets (Taken 8/5/2023  0424)  Progress: no change  Plan of Care Reviewed With: patient  Goal: Patient-Specific Goal (Individualized)  Outcome: Ongoing, Progressing  Goal: Absence of Hospital-Acquired Illness or Injury  Outcome: Ongoing, Progressing  Intervention: Identify and Manage Fall Risk  Recent Flowsheet Documentation  Taken 8/4/2023 2136 by China Morillo RN  Safety Promotion/Fall Prevention: safety round/check completed  Taken 8/4/2023 1930 by China Morillo RN  Safety Promotion/Fall Prevention: safety round/check completed  Intervention: Prevent Skin Injury  Recent Flowsheet Documentation  Taken 8/4/2023 2136 by China Morillo RN  Body Position: supine  Intervention: Prevent and Manage VTE (Venous Thromboembolism) Risk  Recent Flowsheet Documentation  Taken 8/4/2023 2136 by China Morillo RN  VTE Prevention/Management: (see mar) other (see comments)  Goal: Optimal Comfort and Wellbeing  Outcome: Ongoing, Progressing  Intervention: Monitor Pain and Promote Comfort  Recent Flowsheet Documentation  Taken 8/4/2023 2136 by China Morillo RN  Pain Management Interventions: see MAR  Taken 8/4/2023 1951 by China Morillo RN  Pain Management Interventions: see MAR  Intervention: Provide Person-Centered Care  Recent Flowsheet Documentation  Taken 8/4/2023 2136 by China Morillo RN  Trust Relationship/Rapport: care explained  Goal: Readiness for Transition of Care  Outcome: Ongoing, Progressing     Problem: Skin or Soft Tissue Infection  Goal: Absence of Infection Signs and Symptoms  Outcome: Ongoing, Progressing  Intervention: Minimize and Manage Infection Progression  Recent Flowsheet Documentation  Taken 8/4/2023 2136 by China Morillo RN  Isolation Precautions:   contact   spore   precautions maintained     Problem: Pain Acute  Goal: Acceptable Pain Control and Functional Ability  Outcome: Ongoing, Progressing  Intervention: Develop Pain Management Plan  Recent Flowsheet Documentation  Taken 8/4/2023 2136 by China Morillo  RN  Pain Management Interventions: see MAR  Taken 8/4/2023 1951 by China Morillo RN  Pain Management Interventions: see MAR   Goal Outcome Evaluation:  Plan of Care Reviewed With: patient        Progress: no change             Adequate: hears normal conversation without difficulty

## 2024-04-23 ENCOUNTER — RESULT REVIEW (OUTPATIENT)
Age: 69
End: 2024-04-23

## 2024-04-23 ENCOUNTER — APPOINTMENT (OUTPATIENT)
Dept: THORACIC SURGERY | Facility: HOSPITAL | Age: 69
End: 2024-04-23

## 2024-04-23 ENCOUNTER — INPATIENT (INPATIENT)
Facility: HOSPITAL | Age: 69
LOS: 5 days | Discharge: NURSING, REHAB OR LONG-TERM CARE | End: 2024-04-29
Attending: THORACIC SURGERY (CARDIOTHORACIC VASCULAR SURGERY) | Admitting: NEUROLOGICAL SURGERY
Payer: MEDICARE

## 2024-04-23 ENCOUNTER — APPOINTMENT (OUTPATIENT)
Dept: NEUROSURGERY | Facility: HOSPITAL | Age: 69
End: 2024-04-23

## 2024-04-23 VITALS
DIASTOLIC BLOOD PRESSURE: 66 MMHG | HEART RATE: 87 BPM | WEIGHT: 169.98 LBS | TEMPERATURE: 99 F | SYSTOLIC BLOOD PRESSURE: 143 MMHG | OXYGEN SATURATION: 95 % | HEIGHT: 63 IN | RESPIRATION RATE: 16 BRPM

## 2024-04-23 DIAGNOSIS — Z98.890 OTHER SPECIFIED POSTPROCEDURAL STATES: Chronic | ICD-10-CM

## 2024-04-23 DIAGNOSIS — R91.8 OTHER NONSPECIFIC ABNORMAL FINDING OF LUNG FIELD: ICD-10-CM

## 2024-04-23 DIAGNOSIS — C34.12 MALIGNANT NEOPLASM OF UPPER LOBE, LEFT BRONCHUS OR LUNG: ICD-10-CM

## 2024-04-23 LAB
ALBUMIN SERPL ELPH-MCNC: 3.6 G/DL — SIGNIFICANT CHANGE UP (ref 3.3–5)
ALP SERPL-CCNC: 83 U/L — SIGNIFICANT CHANGE UP (ref 40–120)
ALT FLD-CCNC: 10 U/L — SIGNIFICANT CHANGE UP (ref 4–33)
ANION GAP SERPL CALC-SCNC: 12 MMOL/L — SIGNIFICANT CHANGE UP (ref 7–14)
APTT BLD: 32.3 SEC — SIGNIFICANT CHANGE UP (ref 24.5–35.6)
AST SERPL-CCNC: 16 U/L — SIGNIFICANT CHANGE UP (ref 4–32)
BILIRUB SERPL-MCNC: 0.3 MG/DL — SIGNIFICANT CHANGE UP (ref 0.2–1.2)
BLOOD GAS ARTERIAL - LYTES,HGB,ICA,LACT RESULT: SIGNIFICANT CHANGE UP
BUN SERPL-MCNC: 18 MG/DL — SIGNIFICANT CHANGE UP (ref 7–23)
CALCIUM SERPL-MCNC: 9.1 MG/DL — SIGNIFICANT CHANGE UP (ref 8.4–10.5)
CHLORIDE SERPL-SCNC: 104 MMOL/L — SIGNIFICANT CHANGE UP (ref 98–107)
CO2 SERPL-SCNC: 24 MMOL/L — SIGNIFICANT CHANGE UP (ref 22–31)
CREAT SERPL-MCNC: 0.74 MG/DL — SIGNIFICANT CHANGE UP (ref 0.5–1.3)
EGFR: 87 ML/MIN/1.73M2 — SIGNIFICANT CHANGE UP
GLUCOSE SERPL-MCNC: 186 MG/DL — HIGH (ref 70–99)
HCT VFR BLD CALC: 31.7 % — LOW (ref 34.5–45)
HGB BLD-MCNC: 10.2 G/DL — LOW (ref 11.5–15.5)
INR BLD: 0.98 RATIO — SIGNIFICANT CHANGE UP (ref 0.85–1.18)
MCHC RBC-ENTMCNC: 28 PG — SIGNIFICANT CHANGE UP (ref 27–34)
MCHC RBC-ENTMCNC: 32.2 GM/DL — SIGNIFICANT CHANGE UP (ref 32–36)
MCV RBC AUTO: 87.1 FL — SIGNIFICANT CHANGE UP (ref 80–100)
NRBC # BLD: 0 /100 WBCS — SIGNIFICANT CHANGE UP (ref 0–0)
NRBC # FLD: 0 K/UL — SIGNIFICANT CHANGE UP (ref 0–0)
PLATELET # BLD AUTO: 371 K/UL — SIGNIFICANT CHANGE UP (ref 150–400)
POTASSIUM SERPL-MCNC: 3.7 MMOL/L — SIGNIFICANT CHANGE UP (ref 3.5–5.3)
POTASSIUM SERPL-SCNC: 3.7 MMOL/L — SIGNIFICANT CHANGE UP (ref 3.5–5.3)
PROT SERPL-MCNC: 6.7 G/DL — SIGNIFICANT CHANGE UP (ref 6–8.3)
PROTHROM AB SERPL-ACNC: 11 SEC — SIGNIFICANT CHANGE UP (ref 9.5–13)
RBC # BLD: 3.64 M/UL — LOW (ref 3.8–5.2)
RBC # FLD: 14.6 % — HIGH (ref 10.3–14.5)
SODIUM SERPL-SCNC: 140 MMOL/L — SIGNIFICANT CHANGE UP (ref 135–145)
WBC # BLD: 21.38 K/UL — HIGH (ref 3.8–10.5)
WBC # FLD AUTO: 21.38 K/UL — HIGH (ref 3.8–10.5)

## 2024-04-23 PROCEDURE — 88307 TISSUE EXAM BY PATHOLOGIST: CPT | Mod: 26

## 2024-04-23 PROCEDURE — 32505 WEDGE RESECT OF LUNG INITIAL: CPT | Mod: 59,LT

## 2024-04-23 PROCEDURE — 88300 SURGICAL PATH GROSS: CPT | Mod: 26

## 2024-04-23 PROCEDURE — 71045 X-RAY EXAM CHEST 1 VIEW: CPT | Mod: 26

## 2024-04-23 PROCEDURE — 38746 REMOVE THORACIC LYMPH NODES: CPT

## 2024-04-23 PROCEDURE — 99233 SBSQ HOSP IP/OBS HIGH 50: CPT

## 2024-04-23 PROCEDURE — 88309 TISSUE EXAM BY PATHOLOGIST: CPT | Mod: 26

## 2024-04-23 PROCEDURE — 31622 DX BRONCHOSCOPE/WASH: CPT | Mod: 59

## 2024-04-23 PROCEDURE — 32504 RESECT APICAL LUNG TUM/CHEST: CPT

## 2024-04-23 PROCEDURE — 88311 DECALCIFY TISSUE: CPT | Mod: 26

## 2024-04-23 PROCEDURE — 88302 TISSUE EXAM BY PATHOLOGIST: CPT | Mod: 26

## 2024-04-23 PROCEDURE — 88360 TUMOR IMMUNOHISTOCHEM/MANUAL: CPT | Mod: 26

## 2024-04-23 PROCEDURE — 32504 RESECT APICAL LUNG TUM/CHEST: CPT | Mod: 80

## 2024-04-23 PROCEDURE — 88305 TISSUE EXAM BY PATHOLOGIST: CPT | Mod: 26

## 2024-04-23 DEVICE — LIGATING CLIPS WECK HORIZON LARGE (ORANGE) 24: Type: IMPLANTABLE DEVICE | Status: FUNCTIONAL

## 2024-04-23 DEVICE — IMPLANTABLE DEVICE: Type: IMPLANTABLE DEVICE | Status: FUNCTIONAL

## 2024-04-23 DEVICE — LIGATING CLIPS WECK HORIZON SMALL-WIDE (RED) 24: Type: IMPLANTABLE DEVICE | Status: FUNCTIONAL

## 2024-04-23 DEVICE — LIGATING CLIPS WECK HORIZON MEDIUM (BLUE) 24: Type: IMPLANTABLE DEVICE | Status: FUNCTIONAL

## 2024-04-23 DEVICE — STAPLER COVIDIEN TRI-STAPLE 60MM BLACK RELOAD: Type: IMPLANTABLE DEVICE | Status: FUNCTIONAL

## 2024-04-23 DEVICE — SURGICEL 2 X 14": Type: IMPLANTABLE DEVICE | Status: FUNCTIONAL

## 2024-04-23 DEVICE — CHEST DRAIN THORACIC ARGYLE PVC 28FR STRAIGHT: Type: IMPLANTABLE DEVICE | Status: FUNCTIONAL

## 2024-04-23 DEVICE — FLOSEAL WITH RECOTHROM THROMBIN 10ML: Type: IMPLANTABLE DEVICE | Status: FUNCTIONAL

## 2024-04-23 RX ORDER — ACETAMINOPHEN 500 MG
975 TABLET ORAL ONCE
Refills: 0 | Status: COMPLETED | OUTPATIENT
Start: 2024-04-23 | End: 2024-04-23

## 2024-04-23 RX ORDER — ACETAMINOPHEN 500 MG
1000 TABLET ORAL ONCE
Refills: 0 | Status: DISCONTINUED | OUTPATIENT
Start: 2024-04-23 | End: 2024-04-25

## 2024-04-23 RX ORDER — SENNA PLUS 8.6 MG/1
2 TABLET ORAL AT BEDTIME
Refills: 0 | Status: DISCONTINUED | OUTPATIENT
Start: 2024-04-23 | End: 2024-04-29

## 2024-04-23 RX ORDER — HYDROMORPHONE HYDROCHLORIDE 2 MG/ML
250 INJECTION INTRAMUSCULAR; INTRAVENOUS; SUBCUTANEOUS
Refills: 0 | Status: DISCONTINUED | OUTPATIENT
Start: 2024-04-23 | End: 2024-04-25

## 2024-04-23 RX ORDER — POLYETHYLENE GLYCOL 3350 17 G/17G
17 POWDER, FOR SOLUTION ORAL DAILY
Refills: 0 | Status: DISCONTINUED | OUTPATIENT
Start: 2024-04-23 | End: 2024-04-29

## 2024-04-23 RX ORDER — LIDOCAINE 4 G/100G
1 CREAM TOPICAL DAILY
Refills: 0 | Status: COMPLETED | OUTPATIENT
Start: 2024-04-23 | End: 2024-04-27

## 2024-04-23 RX ORDER — ALBUTEROL 90 UG/1
2.5 AEROSOL, METERED ORAL EVERY 6 HOURS
Refills: 0 | Status: DISCONTINUED | OUTPATIENT
Start: 2024-04-23 | End: 2024-04-29

## 2024-04-23 RX ORDER — ONDANSETRON 8 MG/1
4 TABLET, FILM COATED ORAL EVERY 6 HOURS
Refills: 0 | Status: DISCONTINUED | OUTPATIENT
Start: 2024-04-23 | End: 2024-04-29

## 2024-04-23 RX ORDER — NALOXONE HYDROCHLORIDE 4 MG/.1ML
0.1 SPRAY NASAL
Refills: 0 | Status: DISCONTINUED | OUTPATIENT
Start: 2024-04-23 | End: 2024-04-29

## 2024-04-23 RX ORDER — HEPARIN SODIUM 5000 [USP'U]/ML
5000 INJECTION INTRAVENOUS; SUBCUTANEOUS EVERY 8 HOURS
Refills: 0 | Status: DISCONTINUED | OUTPATIENT
Start: 2024-04-23 | End: 2024-04-29

## 2024-04-23 RX ADMIN — SODIUM CHLORIDE 30 MILLILITER(S): 9 INJECTION, SOLUTION INTRAVENOUS at 20:40

## 2024-04-23 RX ADMIN — Medication 975 MILLIGRAM(S): at 13:24

## 2024-04-23 RX ADMIN — Medication 2 MILLILITER(S): at 22:32

## 2024-04-23 RX ADMIN — LIDOCAINE 1 PATCH: 4 CREAM TOPICAL at 22:14

## 2024-04-23 RX ADMIN — HEPARIN SODIUM 5000 UNIT(S): 5000 INJECTION INTRAVENOUS; SUBCUTANEOUS at 22:14

## 2024-04-23 NOTE — ASU PREOP CHECKLIST - PATIENT SENT TO
operating room Calcipotriene Counseling:  I discussed with the patient the risks of calcipotriene including but not limited to erythema, scaling, itching, and irritation.

## 2024-04-23 NOTE — ASU PREOP CHECKLIST - INTERNAL PROSTHESES
left ankle hardware left ankle hardware plate & screws left ankle hardware plate & screws/yes(specify)

## 2024-04-23 NOTE — BRIEF OPERATIVE NOTE - SPECIMENS
portion of rib 6, 2nd inter space post soft tissue, post 3rd rib margin, 3rd ant rib margin, post 2nd rib margin, l 2nd ant rib margin, apical soft tissue marginleft upper lobe tumor with chest wall, 1st, 2nd, and 3rd ribs, MLN 5,7,9,10

## 2024-04-23 NOTE — ASU PREOP CHECKLIST - 1.
fell on 3/4/24 fracture c spine wearing a cervical collar fell on 3/4/24  cervical fracture wearing a cervical collar

## 2024-04-23 NOTE — PROGRESS NOTE ADULT - ASSESSMENT
HPI:  68 year old female with pmhx of COPD, Former smoker (1PPD x 50 years; Quit Jan 2024), November 2023 CT Chest revealing a SAMRA cavitary mass with adjacent rib destruction ans well as a 4 mm RLL nodule. 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 PET/CT (12/2023) showed SAMRA mass. Pt received 3 cycles of neoadjuvant treatment with cisplatin, paclitaxel and nivolumab. Followed by Oncologist Dr. Vineet Melendez.  Patient presents for pre-op evaluations for diagnosis of Other nonspecific abnormal finding of lung field. Patient is scheduled for Bronchoscopy, Mediastinoscopy, left thoracotomy, resection of pancoast tumor with left upper lobe lobectomy and chest wall reconstruction with epidural.   Denies cp, palpitations, dizziness, fevers, n/v/d/c. Patient has post nasal drip cough due to Seasonal allergies.   Patient had a fall 6 weeks ago and sustained a cervical fracture. Follows with Ortho Spine specialist in [facility] in [place], Dr Barger.  (17 Apr 2024 12:08)    4/23: Postop left thoracotomy, resection of pancoast tumor with neurolysis

## 2024-04-23 NOTE — BRIEF OPERATIVE NOTE - NSICDXBRIEFPROCEDURE_GEN_ALL_CORE_FT
PROCEDURES:  Surgical removal of Pancoast tumor 23-Apr-2024 19:27:14 FB, Left Thoracotomy w/ resection of pancoast tumor and SAMRA wedge, resection of chest wall(L 1-3rd tribs) w/ Reconstruction w. RENETTA jiang Jane

## 2024-04-23 NOTE — BRIEF OPERATIVE NOTE - OPERATION/FINDINGS
large pancoast tumor invading l 1-3 ribs involving C7-8, T1 nerve roots and brachial plexus. NS did Neurolysis

## 2024-04-24 LAB
ANION GAP SERPL CALC-SCNC: 10 MMOL/L — SIGNIFICANT CHANGE UP (ref 7–14)
APTT BLD: 30.4 SEC — SIGNIFICANT CHANGE UP (ref 24.5–35.6)
BASOPHILS # BLD AUTO: 0.02 K/UL — SIGNIFICANT CHANGE UP (ref 0–0.2)
BASOPHILS NFR BLD AUTO: 0.1 % — SIGNIFICANT CHANGE UP (ref 0–2)
BUN SERPL-MCNC: 17 MG/DL — SIGNIFICANT CHANGE UP (ref 7–23)
CALCIUM SERPL-MCNC: 8.5 MG/DL — SIGNIFICANT CHANGE UP (ref 8.4–10.5)
CHLORIDE SERPL-SCNC: 105 MMOL/L — SIGNIFICANT CHANGE UP (ref 98–107)
CO2 SERPL-SCNC: 25 MMOL/L — SIGNIFICANT CHANGE UP (ref 22–31)
CREAT SERPL-MCNC: 0.67 MG/DL — SIGNIFICANT CHANGE UP (ref 0.5–1.3)
EGFR: 94 ML/MIN/1.73M2 — SIGNIFICANT CHANGE UP
EOSINOPHIL # BLD AUTO: 0 K/UL — SIGNIFICANT CHANGE UP (ref 0–0.5)
EOSINOPHIL NFR BLD AUTO: 0 % — SIGNIFICANT CHANGE UP (ref 0–6)
GLUCOSE SERPL-MCNC: 141 MG/DL — HIGH (ref 70–99)
HCT VFR BLD CALC: 28.9 % — LOW (ref 34.5–45)
HGB BLD-MCNC: 9.2 G/DL — LOW (ref 11.5–15.5)
IANC: 14.08 K/UL — HIGH (ref 1.8–7.4)
IMM GRANULOCYTES NFR BLD AUTO: 0.5 % — SIGNIFICANT CHANGE UP (ref 0–0.9)
INR BLD: 0.99 RATIO — SIGNIFICANT CHANGE UP (ref 0.85–1.18)
LYMPHOCYTES # BLD AUTO: 0.73 K/UL — LOW (ref 1–3.3)
LYMPHOCYTES # BLD AUTO: 4.5 % — LOW (ref 13–44)
MAGNESIUM SERPL-MCNC: 2 MG/DL — SIGNIFICANT CHANGE UP (ref 1.6–2.6)
MCHC RBC-ENTMCNC: 28.8 PG — SIGNIFICANT CHANGE UP (ref 27–34)
MCHC RBC-ENTMCNC: 31.8 GM/DL — LOW (ref 32–36)
MCV RBC AUTO: 90.3 FL — SIGNIFICANT CHANGE UP (ref 80–100)
MONOCYTES # BLD AUTO: 1.3 K/UL — HIGH (ref 0–0.9)
MONOCYTES NFR BLD AUTO: 8 % — SIGNIFICANT CHANGE UP (ref 2–14)
NEUTROPHILS # BLD AUTO: 14.08 K/UL — HIGH (ref 1.8–7.4)
NEUTROPHILS NFR BLD AUTO: 86.9 % — HIGH (ref 43–77)
NRBC # BLD: 0 /100 WBCS — SIGNIFICANT CHANGE UP (ref 0–0)
NRBC # FLD: 0 K/UL — SIGNIFICANT CHANGE UP (ref 0–0)
PHOSPHATE SERPL-MCNC: 4.9 MG/DL — HIGH (ref 2.5–4.5)
PLATELET # BLD AUTO: 297 K/UL — SIGNIFICANT CHANGE UP (ref 150–400)
POTASSIUM SERPL-MCNC: 4.2 MMOL/L — SIGNIFICANT CHANGE UP (ref 3.5–5.3)
POTASSIUM SERPL-SCNC: 4.2 MMOL/L — SIGNIFICANT CHANGE UP (ref 3.5–5.3)
PROTHROM AB SERPL-ACNC: 11.2 SEC — SIGNIFICANT CHANGE UP (ref 9.5–13)
RBC # BLD: 3.2 M/UL — LOW (ref 3.8–5.2)
RBC # FLD: 14.4 % — SIGNIFICANT CHANGE UP (ref 10.3–14.5)
SODIUM SERPL-SCNC: 140 MMOL/L — SIGNIFICANT CHANGE UP (ref 135–145)
WBC # BLD: 16.21 K/UL — HIGH (ref 3.8–10.5)
WBC # FLD AUTO: 16.21 K/UL — HIGH (ref 3.8–10.5)

## 2024-04-24 PROCEDURE — 71045 X-RAY EXAM CHEST 1 VIEW: CPT | Mod: 26

## 2024-04-24 PROCEDURE — 93010 ELECTROCARDIOGRAM REPORT: CPT

## 2024-04-24 PROCEDURE — 99233 SBSQ HOSP IP/OBS HIGH 50: CPT

## 2024-04-24 RX ORDER — ACETAMINOPHEN 500 MG
1000 TABLET ORAL ONCE
Refills: 0 | Status: COMPLETED | OUTPATIENT
Start: 2024-04-24 | End: 2024-04-24

## 2024-04-24 RX ORDER — LIDOCAINE HCL 20 MG/ML
2 VIAL (ML) INJECTION ONCE
Refills: 0 | Status: COMPLETED | OUTPATIENT
Start: 2024-04-24 | End: 2024-04-23

## 2024-04-24 RX ORDER — ALBUMIN HUMAN 25 %
250 VIAL (ML) INTRAVENOUS ONCE
Refills: 0 | Status: COMPLETED | OUTPATIENT
Start: 2024-04-24 | End: 2024-04-24

## 2024-04-24 RX ORDER — ROPIVACAINE HCL/PF 5 MG/ML
200 AMPUL (ML) INJECTION
Refills: 0 | Status: DISCONTINUED | OUTPATIENT
Start: 2024-04-24 | End: 2024-04-25

## 2024-04-24 RX ORDER — PHENYLEPHRINE HYDROCHLORIDE 10 MG/ML
0.5 INJECTION INTRAVENOUS
Qty: 40 | Refills: 0 | Status: DISCONTINUED | OUTPATIENT
Start: 2024-04-24 | End: 2024-04-26

## 2024-04-24 RX ORDER — METOCLOPRAMIDE HCL 10 MG
10 TABLET ORAL ONCE
Refills: 0 | Status: COMPLETED | OUTPATIENT
Start: 2024-04-24 | End: 2024-04-24

## 2024-04-24 RX ADMIN — HEPARIN SODIUM 5000 UNIT(S): 5000 INJECTION INTRAVENOUS; SUBCUTANEOUS at 14:23

## 2024-04-24 RX ADMIN — Medication 500 MILLILITER(S): at 02:09

## 2024-04-24 RX ADMIN — Medication 400 MILLIGRAM(S): at 22:33

## 2024-04-24 RX ADMIN — HEPARIN SODIUM 5000 UNIT(S): 5000 INJECTION INTRAVENOUS; SUBCUTANEOUS at 21:59

## 2024-04-24 RX ADMIN — SENNA PLUS 2 TABLET(S): 8.6 TABLET ORAL at 21:58

## 2024-04-24 RX ADMIN — Medication 10 MILLIGRAM(S): at 06:20

## 2024-04-24 RX ADMIN — ALBUTEROL 2.5 MILLIGRAM(S): 90 AEROSOL, METERED ORAL at 21:59

## 2024-04-24 RX ADMIN — ONDANSETRON 4 MILLIGRAM(S): 8 TABLET, FILM COATED ORAL at 19:31

## 2024-04-24 RX ADMIN — Medication 1000 MILLIGRAM(S): at 23:00

## 2024-04-24 RX ADMIN — Medication 400 MILLIGRAM(S): at 06:32

## 2024-04-24 RX ADMIN — ALBUTEROL 2.5 MILLIGRAM(S): 90 AEROSOL, METERED ORAL at 08:55

## 2024-04-24 RX ADMIN — Medication 200 MILLILITER(S): at 22:31

## 2024-04-24 RX ADMIN — LIDOCAINE 1 PATCH: 4 CREAM TOPICAL at 10:07

## 2024-04-24 RX ADMIN — ONDANSETRON 4 MILLIGRAM(S): 8 TABLET, FILM COATED ORAL at 06:05

## 2024-04-24 RX ADMIN — SODIUM CHLORIDE 30 MILLILITER(S): 9 INJECTION, SOLUTION INTRAVENOUS at 22:09

## 2024-04-24 RX ADMIN — Medication 1000 MILLIGRAM(S): at 07:02

## 2024-04-24 RX ADMIN — ALBUTEROL 2.5 MILLIGRAM(S): 90 AEROSOL, METERED ORAL at 15:14

## 2024-04-24 RX ADMIN — HEPARIN SODIUM 5000 UNIT(S): 5000 INJECTION INTRAVENOUS; SUBCUTANEOUS at 06:33

## 2024-04-24 RX ADMIN — LIDOCAINE 1 PATCH: 4 CREAM TOPICAL at 22:00

## 2024-04-24 RX ADMIN — HYDROMORPHONE HYDROCHLORIDE 250 MILLILITER(S): 2 INJECTION INTRAMUSCULAR; INTRAVENOUS; SUBCUTANEOUS at 19:48

## 2024-04-24 RX ADMIN — LIDOCAINE 1 PATCH: 4 CREAM TOPICAL at 07:11

## 2024-04-24 NOTE — DIETITIAN INITIAL EVALUATION ADULT - OTHER INFO
Per chart, pt is 68 year old female PMH COPD, former smoker (quit 1/2024), squamous cell carcinoma of lungs (12/2023) s/p chemo now POD #1 s/p left thoracotomy with resection of pancoast tumor and SAMRA wedge, resection of chest wall, reconstruction with gortex.     Pt confirms NKFA, denies difficulties chewing/swallowing. Pt lives at home, independent with ADLs PTA. Pt reports generally good appetite/PO intake PTA, consumes a regular diet at baseline; denies prior use of nutrition shakes.     Pt was initially ordered for a puree diet, has since been upgraded to regular. Pt amenable to provision of nutrition shake. Pt denies GI distress, last BM 4/22 per flowsheets; ordered for senna 2 tablets qHS and Miralax 17 gm qD. Labs notable for hyperphosphatemia.

## 2024-04-24 NOTE — PHYSICAL THERAPY INITIAL EVALUATION ADULT - MANUAL MUSCLE TESTING RESULTS, REHAB EVAL
Right UE and LE grossly 4/5 throughout; Left UE grossly 3/5 throughout; Left LE grossly 3+/5 throughout

## 2024-04-24 NOTE — PHYSICAL THERAPY INITIAL EVALUATION ADULT - GENERAL OBSERVATIONS, REHAB EVAL
Pt received semi-supine in bed, +nasal cannula, +telemetry, +[place], +pink lung pillow supporting head and neck in lieu of cervical collar. Pt agreeable to participate in PT evaluation.

## 2024-04-24 NOTE — DIETITIAN INITIAL EVALUATION ADULT - ADD RECOMMEND
1) Recommend continue regular diet.  2) RDN to provide Orgain Vegan shake 1 PO daily (provides 220 kcal, 16 gm protein per 11oz serving).

## 2024-04-24 NOTE — DIETITIAN INITIAL EVALUATION ADULT - PERTINENT MEDS FT
lactated ringers IV Continuous   phenylephrine  IV Continuous   polyethylene glycol   senna  ondansetron IV PRN

## 2024-04-24 NOTE — DIETITIAN INITIAL EVALUATION ADULT - OTHER CALCULATIONS
Dosing weight (3/23) 169.9 lbs / 77.1 kg.  Recent chart weight (3/27) 178 lbs.   Apparent ~8 lb (4%) weight loss x1 month, clinically significant. Pt denies recent significant changes to weight.   Ideal Body Weight: 115 lbs / 52.3 kg +/-10%

## 2024-04-24 NOTE — PHYSICAL THERAPY INITIAL EVALUATION ADULT - LEVEL OF INDEPENDENCE, REHAB EVAL
Functional mobility assessment held secondary to pt pending CT scan and followup from Ortho for cervical spine bracing per MD. Will assess once appropriate.

## 2024-04-24 NOTE — PHYSICAL THERAPY INITIAL EVALUATION ADULT - DID THE PATIENT HAVE SURGERY?
Left Thoracotomy with resection of pancoast tumor and Left Upper Lobe wedge, resection of chest wall (L 1-3rd tribs) with Reconstruction with gortex/yes

## 2024-04-24 NOTE — PHYSICAL THERAPY INITIAL EVALUATION ADULT - PERTINENT HX OF CURRENT PROBLEM, REHAB EVAL
68 year old female with past medical history stated below, presents for pre-op evaluations for diagnosis of other nonspecific abnormal finding of lung field. Patient is scheduled for Bronchoscopy, Mediastinoscopy, left thoracotomy, resection of pancoast tumor with left upper lobe lobectomy and chest wall reconstruction with epidural. Patient had a fall 6 weeks ago and sustained a cervical fracture. Follows with Ortho Spine specialist in [facility] in [place], Dr Barger.

## 2024-04-24 NOTE — PHYSICAL THERAPY INITIAL EVALUATION ADULT - ADDITIONAL COMMENTS
Pt lives in an apartment alone, +1 flight inside to negotiate, was independent with all functional mobility and ADL performance without use of an assistive device prior to admission.     Pt left semi-supine in bed, all lines intact, all needs in reach, bed alarm set, in NAD. MARISA Ribeiro aware. Heart rate 83 beats per minute.

## 2024-04-25 LAB
ANION GAP SERPL CALC-SCNC: 10 MMOL/L — SIGNIFICANT CHANGE UP (ref 7–14)
BUN SERPL-MCNC: 20 MG/DL — SIGNIFICANT CHANGE UP (ref 7–23)
CA-I BLD-SCNC: 1.18 MMOL/L — SIGNIFICANT CHANGE UP (ref 1.15–1.29)
CALCIUM SERPL-MCNC: 8.6 MG/DL — SIGNIFICANT CHANGE UP (ref 8.4–10.5)
CHLORIDE SERPL-SCNC: 103 MMOL/L — SIGNIFICANT CHANGE UP (ref 98–107)
CO2 SERPL-SCNC: 26 MMOL/L — SIGNIFICANT CHANGE UP (ref 22–31)
CREAT SERPL-MCNC: 0.88 MG/DL — SIGNIFICANT CHANGE UP (ref 0.5–1.3)
EGFR: 71 ML/MIN/1.73M2 — SIGNIFICANT CHANGE UP
GLUCOSE SERPL-MCNC: 148 MG/DL — HIGH (ref 70–99)
HCT VFR BLD CALC: 30 % — LOW (ref 34.5–45)
HGB BLD-MCNC: 9.6 G/DL — LOW (ref 11.5–15.5)
MAGNESIUM SERPL-MCNC: 2.1 MG/DL — SIGNIFICANT CHANGE UP (ref 1.6–2.6)
MCHC RBC-ENTMCNC: 29.4 PG — SIGNIFICANT CHANGE UP (ref 27–34)
MCHC RBC-ENTMCNC: 32 GM/DL — SIGNIFICANT CHANGE UP (ref 32–36)
MCV RBC AUTO: 91.7 FL — SIGNIFICANT CHANGE UP (ref 80–100)
NRBC # BLD: 0 /100 WBCS — SIGNIFICANT CHANGE UP (ref 0–0)
NRBC # FLD: 0 K/UL — SIGNIFICANT CHANGE UP (ref 0–0)
PHOSPHATE SERPL-MCNC: 3.1 MG/DL — SIGNIFICANT CHANGE UP (ref 2.5–4.5)
PLATELET # BLD AUTO: 309 K/UL — SIGNIFICANT CHANGE UP (ref 150–400)
POTASSIUM SERPL-MCNC: 4.4 MMOL/L — SIGNIFICANT CHANGE UP (ref 3.5–5.3)
POTASSIUM SERPL-SCNC: 4.4 MMOL/L — SIGNIFICANT CHANGE UP (ref 3.5–5.3)
RBC # BLD: 3.27 M/UL — LOW (ref 3.8–5.2)
RBC # FLD: 14.7 % — HIGH (ref 10.3–14.5)
SODIUM SERPL-SCNC: 139 MMOL/L — SIGNIFICANT CHANGE UP (ref 135–145)
WBC # BLD: 13.38 K/UL — HIGH (ref 3.8–10.5)
WBC # FLD AUTO: 13.38 K/UL — HIGH (ref 3.8–10.5)

## 2024-04-25 PROCEDURE — 72125 CT NECK SPINE W/O DYE: CPT | Mod: 26

## 2024-04-25 PROCEDURE — 71045 X-RAY EXAM CHEST 1 VIEW: CPT | Mod: 26

## 2024-04-25 PROCEDURE — 99233 SBSQ HOSP IP/OBS HIGH 50: CPT

## 2024-04-25 RX ORDER — ACETAMINOPHEN 500 MG
650 TABLET ORAL EVERY 6 HOURS
Refills: 0 | Status: COMPLETED | OUTPATIENT
Start: 2024-04-25 | End: 2024-04-27

## 2024-04-25 RX ORDER — HYDROMORPHONE HYDROCHLORIDE 2 MG/ML
0.2 INJECTION INTRAMUSCULAR; INTRAVENOUS; SUBCUTANEOUS
Refills: 0 | Status: DISCONTINUED | OUTPATIENT
Start: 2024-04-25 | End: 2024-04-27

## 2024-04-25 RX ORDER — ACETAMINOPHEN 500 MG
1000 TABLET ORAL ONCE
Refills: 0 | Status: COMPLETED | OUTPATIENT
Start: 2024-04-25 | End: 2024-04-25

## 2024-04-25 RX ORDER — ROPIVACAINE HCL/PF 5 MG/ML
200 AMPUL (ML) INJECTION
Refills: 0 | Status: DISCONTINUED | OUTPATIENT
Start: 2024-04-25 | End: 2024-04-26

## 2024-04-25 RX ORDER — CYCLOBENZAPRINE HYDROCHLORIDE 10 MG/1
2.5 TABLET, FILM COATED ORAL EVERY 8 HOURS
Refills: 0 | Status: DISCONTINUED | OUTPATIENT
Start: 2024-04-25 | End: 2024-04-29

## 2024-04-25 RX ADMIN — LIDOCAINE 1 PATCH: 4 CREAM TOPICAL at 09:50

## 2024-04-25 RX ADMIN — Medication 200 MILLILITER(S): at 09:27

## 2024-04-25 RX ADMIN — ALBUTEROL 2.5 MILLIGRAM(S): 90 AEROSOL, METERED ORAL at 15:16

## 2024-04-25 RX ADMIN — CYCLOBENZAPRINE HYDROCHLORIDE 2.5 MILLIGRAM(S): 10 TABLET, FILM COATED ORAL at 10:19

## 2024-04-25 RX ADMIN — Medication 200 MILLILITER(S): at 19:39

## 2024-04-25 RX ADMIN — ALBUTEROL 2.5 MILLIGRAM(S): 90 AEROSOL, METERED ORAL at 08:39

## 2024-04-25 RX ADMIN — Medication 650 MILLIGRAM(S): at 18:36

## 2024-04-25 RX ADMIN — Medication 1000 MILLIGRAM(S): at 07:00

## 2024-04-25 RX ADMIN — LIDOCAINE 1 PATCH: 4 CREAM TOPICAL at 07:32

## 2024-04-25 RX ADMIN — ALBUTEROL 2.5 MILLIGRAM(S): 90 AEROSOL, METERED ORAL at 03:44

## 2024-04-25 RX ADMIN — ONDANSETRON 4 MILLIGRAM(S): 8 TABLET, FILM COATED ORAL at 06:24

## 2024-04-25 RX ADMIN — LIDOCAINE 1 PATCH: 4 CREAM TOPICAL at 21:09

## 2024-04-25 RX ADMIN — HEPARIN SODIUM 5000 UNIT(S): 5000 INJECTION INTRAVENOUS; SUBCUTANEOUS at 21:10

## 2024-04-25 RX ADMIN — Medication 400 MILLIGRAM(S): at 06:25

## 2024-04-25 RX ADMIN — Medication 650 MILLIGRAM(S): at 13:09

## 2024-04-25 RX ADMIN — ALBUTEROL 2.5 MILLIGRAM(S): 90 AEROSOL, METERED ORAL at 21:43

## 2024-04-25 RX ADMIN — HEPARIN SODIUM 5000 UNIT(S): 5000 INJECTION INTRAVENOUS; SUBCUTANEOUS at 06:24

## 2024-04-25 RX ADMIN — HEPARIN SODIUM 5000 UNIT(S): 5000 INJECTION INTRAVENOUS; SUBCUTANEOUS at 13:18

## 2024-04-25 NOTE — OCCUPATIONAL THERAPY INITIAL EVALUATION ADULT - LIGHT TOUCH SENSATION, LLE, REHAB EVAL
Reports chronic numbness of left ankle due to history of ankle surgery, otherwise light touch sensation intact

## 2024-04-25 NOTE — OCCUPATIONAL THERAPY INITIAL EVALUATION ADULT - PRECAUTIONS/LIMITATIONS, REHAB EVAL
+cervical collar, history of cervical spine fracture/fall precautions/spinal precautions/surgical precautions

## 2024-04-25 NOTE — OCCUPATIONAL THERAPY INITIAL EVALUATION ADULT - DIAGNOSIS, OT EVAL
s/p left thoracotomy, s/p resection of pancoast tumor with left upper lobe lobectomy; decreased functional mobility, decreased ADL performance

## 2024-04-25 NOTE — OCCUPATIONAL THERAPY INITIAL EVALUATION ADULT - GENERAL OBSERVATIONS, REHAB EVAL
Patient received semisupine in bed in NAD; agreeable to participate in OT evaluation. +cervical collar. +telemetry monitor. +PCEA pump. +IV. +chest tubes x 2. +2L/min O2 via NC.  bpm.

## 2024-04-25 NOTE — OCCUPATIONAL THERAPY INITIAL EVALUATION ADULT - PERTINENT HX OF CURRENT PROBLEM, REHAB EVAL
68 year old female presents for pre-op evaluations for diagnosis of other nonspecific abnormal finding of lung field. Patient now s/p Bronchoscopy, Mediastinoscopy, left thoracotomy, resection of pancoast tumor with left upper lobe lobectomy and chest wall reconstruction with epidural on 4/23/24.. Patient had a fall 6 weeks ago and sustained a cervical fracture.

## 2024-04-25 NOTE — CHART NOTE - NSCHARTNOTEFT_GEN_A_CORE
Imaging reviewed with attending. C spine MRI showing fractures are not fully healed. Rec [place] J collar when OOB.     [id number]    Case discussed with attending neurosurgeon Dr. Brooks    < from: CT Cervical Spine No Cont (04.25.24 @ 15:08) >    IMPRESSION:    1.  Subacute fracture of the right lateral mass of C6, with mild C6-7   anterolisthesis.  2.  Subacute compression deformities from C7-T3.  3.  Postsurgical changes at the left lung apex.

## 2024-04-25 NOTE — OCCUPATIONAL THERAPY INITIAL EVALUATION ADULT - RANGE OF MOTION EXAMINATION, UPPER EXTREMITY
Left UE active ROM WFL except left shoulder active assisted ROM limited due to discomfort/Right UE Active ROM was WFL (within functional limits)

## 2024-04-26 LAB
ANION GAP SERPL CALC-SCNC: 9 MMOL/L — SIGNIFICANT CHANGE UP (ref 7–14)
APTT BLD: 29.8 SEC — SIGNIFICANT CHANGE UP (ref 24.5–35.6)
BUN SERPL-MCNC: 15 MG/DL — SIGNIFICANT CHANGE UP (ref 7–23)
CALCIUM SERPL-MCNC: 8.5 MG/DL — SIGNIFICANT CHANGE UP (ref 8.4–10.5)
CHLORIDE SERPL-SCNC: 104 MMOL/L — SIGNIFICANT CHANGE UP (ref 98–107)
CO2 SERPL-SCNC: 27 MMOL/L — SIGNIFICANT CHANGE UP (ref 22–31)
CREAT SERPL-MCNC: 0.72 MG/DL — SIGNIFICANT CHANGE UP (ref 0.5–1.3)
EGFR: 90 ML/MIN/1.73M2 — SIGNIFICANT CHANGE UP
GLUCOSE SERPL-MCNC: 123 MG/DL — HIGH (ref 70–99)
HCT VFR BLD CALC: 27.6 % — LOW (ref 34.5–45)
HGB BLD-MCNC: 8.7 G/DL — LOW (ref 11.5–15.5)
INR BLD: 0.94 RATIO — SIGNIFICANT CHANGE UP (ref 0.85–1.18)
MAGNESIUM SERPL-MCNC: 2 MG/DL — SIGNIFICANT CHANGE UP (ref 1.6–2.6)
MCHC RBC-ENTMCNC: 28.9 PG — SIGNIFICANT CHANGE UP (ref 27–34)
MCHC RBC-ENTMCNC: 31.5 GM/DL — LOW (ref 32–36)
MCV RBC AUTO: 91.7 FL — SIGNIFICANT CHANGE UP (ref 80–100)
NRBC # BLD: 0 /100 WBCS — SIGNIFICANT CHANGE UP (ref 0–0)
NRBC # FLD: 0 K/UL — SIGNIFICANT CHANGE UP (ref 0–0)
PHOSPHATE SERPL-MCNC: 2.4 MG/DL — LOW (ref 2.5–4.5)
PLATELET # BLD AUTO: 298 K/UL — SIGNIFICANT CHANGE UP (ref 150–400)
POTASSIUM SERPL-MCNC: 3.9 MMOL/L — SIGNIFICANT CHANGE UP (ref 3.5–5.3)
POTASSIUM SERPL-SCNC: 3.9 MMOL/L — SIGNIFICANT CHANGE UP (ref 3.5–5.3)
PROTHROM AB SERPL-ACNC: 10.6 SEC — SIGNIFICANT CHANGE UP (ref 9.5–13)
RBC # BLD: 3.01 M/UL — LOW (ref 3.8–5.2)
RBC # FLD: 14.5 % — SIGNIFICANT CHANGE UP (ref 10.3–14.5)
SODIUM SERPL-SCNC: 140 MMOL/L — SIGNIFICANT CHANGE UP (ref 135–145)
WBC # BLD: 12.64 K/UL — HIGH (ref 3.8–10.5)
WBC # FLD AUTO: 12.64 K/UL — HIGH (ref 3.8–10.5)

## 2024-04-26 PROCEDURE — 71045 X-RAY EXAM CHEST 1 VIEW: CPT | Mod: 26

## 2024-04-26 PROCEDURE — 99233 SBSQ HOSP IP/OBS HIGH 50: CPT

## 2024-04-26 PROCEDURE — 71045 X-RAY EXAM CHEST 1 VIEW: CPT | Mod: 26,77

## 2024-04-26 RX ORDER — POTASSIUM CHLORIDE 20 MEQ
40 PACKET (EA) ORAL ONCE
Refills: 0 | Status: COMPLETED | OUTPATIENT
Start: 2024-04-26 | End: 2024-04-26

## 2024-04-26 RX ORDER — SODIUM,POTASSIUM PHOSPHATES 278-250MG
1 POWDER IN PACKET (EA) ORAL ONCE
Refills: 0 | Status: COMPLETED | OUTPATIENT
Start: 2024-04-26 | End: 2024-04-26

## 2024-04-26 RX ORDER — DORNASE ALFA 1 MG/ML
2.5 SOLUTION RESPIRATORY (INHALATION) DAILY
Refills: 0 | Status: DISCONTINUED | OUTPATIENT
Start: 2024-04-26 | End: 2024-04-29

## 2024-04-26 RX ORDER — SODIUM CHLORIDE 9 MG/ML
4 INJECTION INTRAMUSCULAR; INTRAVENOUS; SUBCUTANEOUS EVERY 12 HOURS
Refills: 0 | Status: DISCONTINUED | OUTPATIENT
Start: 2024-04-26 | End: 2024-04-27

## 2024-04-26 RX ORDER — ROPIVACAINE HCL/PF 5 MG/ML
5 AMPUL (ML) INJECTION
Refills: 0 | Status: DISCONTINUED | OUTPATIENT
Start: 2024-04-26 | End: 2024-04-27

## 2024-04-26 RX ORDER — ROPIVACAINE HCL/PF 5 MG/ML
200 AMPUL (ML) INJECTION
Refills: 0 | Status: DISCONTINUED | OUTPATIENT
Start: 2024-04-26 | End: 2024-04-27

## 2024-04-26 RX ADMIN — Medication 1 PACKET(S): at 05:42

## 2024-04-26 RX ADMIN — LIDOCAINE 1 PATCH: 4 CREAM TOPICAL at 07:05

## 2024-04-26 RX ADMIN — HEPARIN SODIUM 5000 UNIT(S): 5000 INJECTION INTRAVENOUS; SUBCUTANEOUS at 14:30

## 2024-04-26 RX ADMIN — LIDOCAINE 1 PATCH: 4 CREAM TOPICAL at 21:34

## 2024-04-26 RX ADMIN — Medication 200 MILLILITER(S): at 07:22

## 2024-04-26 RX ADMIN — HYDROMORPHONE HYDROCHLORIDE 0.2 MILLIGRAM(S): 2 INJECTION INTRAMUSCULAR; INTRAVENOUS; SUBCUTANEOUS at 10:25

## 2024-04-26 RX ADMIN — Medication 650 MILLIGRAM(S): at 17:48

## 2024-04-26 RX ADMIN — Medication 200 MILLILITER(S): at 11:44

## 2024-04-26 RX ADMIN — Medication 650 MILLIGRAM(S): at 12:45

## 2024-04-26 RX ADMIN — POLYETHYLENE GLYCOL 3350 17 GRAM(S): 17 POWDER, FOR SOLUTION ORAL at 11:55

## 2024-04-26 RX ADMIN — Medication 650 MILLIGRAM(S): at 05:42

## 2024-04-26 RX ADMIN — ALBUTEROL 2.5 MILLIGRAM(S): 90 AEROSOL, METERED ORAL at 03:40

## 2024-04-26 RX ADMIN — SODIUM CHLORIDE 4 MILLILITER(S): 9 INJECTION INTRAMUSCULAR; INTRAVENOUS; SUBCUTANEOUS at 10:44

## 2024-04-26 RX ADMIN — HYDROMORPHONE HYDROCHLORIDE 0.2 MILLIGRAM(S): 2 INJECTION INTRAMUSCULAR; INTRAVENOUS; SUBCUTANEOUS at 10:10

## 2024-04-26 RX ADMIN — HEPARIN SODIUM 5000 UNIT(S): 5000 INJECTION INTRAVENOUS; SUBCUTANEOUS at 05:43

## 2024-04-26 RX ADMIN — Medication 650 MILLIGRAM(S): at 12:12

## 2024-04-26 RX ADMIN — Medication 40 MILLIEQUIVALENT(S): at 05:42

## 2024-04-26 RX ADMIN — ALBUTEROL 2.5 MILLIGRAM(S): 90 AEROSOL, METERED ORAL at 15:54

## 2024-04-26 RX ADMIN — Medication 200 MILLILITER(S): at 20:03

## 2024-04-26 RX ADMIN — Medication 650 MILLIGRAM(S): at 00:31

## 2024-04-26 RX ADMIN — DORNASE ALFA 2.5 MILLIGRAM(S): 1 SOLUTION RESPIRATORY (INHALATION) at 10:45

## 2024-04-26 RX ADMIN — CYCLOBENZAPRINE HYDROCHLORIDE 2.5 MILLIGRAM(S): 10 TABLET, FILM COATED ORAL at 05:42

## 2024-04-26 RX ADMIN — LIDOCAINE 1 PATCH: 4 CREAM TOPICAL at 09:30

## 2024-04-26 RX ADMIN — SODIUM CHLORIDE 4 MILLILITER(S): 9 INJECTION INTRAMUSCULAR; INTRAVENOUS; SUBCUTANEOUS at 21:32

## 2024-04-26 RX ADMIN — SODIUM CHLORIDE 30 MILLILITER(S): 9 INJECTION, SOLUTION INTRAVENOUS at 19:40

## 2024-04-26 RX ADMIN — ALBUTEROL 2.5 MILLIGRAM(S): 90 AEROSOL, METERED ORAL at 10:44

## 2024-04-26 RX ADMIN — Medication 650 MILLIGRAM(S): at 06:30

## 2024-04-26 RX ADMIN — Medication 650 MILLIGRAM(S): at 18:16

## 2024-04-26 RX ADMIN — HEPARIN SODIUM 5000 UNIT(S): 5000 INJECTION INTRAVENOUS; SUBCUTANEOUS at 21:34

## 2024-04-26 RX ADMIN — Medication 650 MILLIGRAM(S): at 01:00

## 2024-04-26 RX ADMIN — ALBUTEROL 2.5 MILLIGRAM(S): 90 AEROSOL, METERED ORAL at 21:32

## 2024-04-27 ENCOUNTER — TRANSCRIPTION ENCOUNTER (OUTPATIENT)
Age: 69
End: 2024-04-27

## 2024-04-27 LAB
ANION GAP SERPL CALC-SCNC: 7 MMOL/L — SIGNIFICANT CHANGE UP (ref 7–14)
APTT BLD: 32.4 SEC — SIGNIFICANT CHANGE UP (ref 24.5–35.6)
BLD GP AB SCN SERPL QL: NEGATIVE — SIGNIFICANT CHANGE UP
BUN SERPL-MCNC: 17 MG/DL — SIGNIFICANT CHANGE UP (ref 7–23)
CALCIUM SERPL-MCNC: 8.6 MG/DL — SIGNIFICANT CHANGE UP (ref 8.4–10.5)
CHLORIDE SERPL-SCNC: 104 MMOL/L — SIGNIFICANT CHANGE UP (ref 98–107)
CO2 SERPL-SCNC: 29 MMOL/L — SIGNIFICANT CHANGE UP (ref 22–31)
CREAT SERPL-MCNC: 0.57 MG/DL — SIGNIFICANT CHANGE UP (ref 0.5–1.3)
EGFR: 98 ML/MIN/1.73M2 — SIGNIFICANT CHANGE UP
GLUCOSE SERPL-MCNC: 102 MG/DL — HIGH (ref 70–99)
HCT VFR BLD CALC: 27.3 % — LOW (ref 34.5–45)
HGB BLD-MCNC: 8.8 G/DL — LOW (ref 11.5–15.5)
INR BLD: 0.97 RATIO — SIGNIFICANT CHANGE UP (ref 0.85–1.18)
MAGNESIUM SERPL-MCNC: 2 MG/DL — SIGNIFICANT CHANGE UP (ref 1.6–2.6)
MCHC RBC-ENTMCNC: 29.4 PG — SIGNIFICANT CHANGE UP (ref 27–34)
MCHC RBC-ENTMCNC: 32.2 GM/DL — SIGNIFICANT CHANGE UP (ref 32–36)
MCV RBC AUTO: 91.3 FL — SIGNIFICANT CHANGE UP (ref 80–100)
NRBC # BLD: 0 /100 WBCS — SIGNIFICANT CHANGE UP (ref 0–0)
NRBC # FLD: 0 K/UL — SIGNIFICANT CHANGE UP (ref 0–0)
PHOSPHATE SERPL-MCNC: 2.6 MG/DL — SIGNIFICANT CHANGE UP (ref 2.5–4.5)
PLATELET # BLD AUTO: 319 K/UL — SIGNIFICANT CHANGE UP (ref 150–400)
POTASSIUM SERPL-MCNC: 4.1 MMOL/L — SIGNIFICANT CHANGE UP (ref 3.5–5.3)
POTASSIUM SERPL-SCNC: 4.1 MMOL/L — SIGNIFICANT CHANGE UP (ref 3.5–5.3)
PROTHROM AB SERPL-ACNC: 10.9 SEC — SIGNIFICANT CHANGE UP (ref 9.5–13)
RBC # BLD: 2.99 M/UL — LOW (ref 3.8–5.2)
RBC # FLD: 14.6 % — HIGH (ref 10.3–14.5)
RH IG SCN BLD-IMP: POSITIVE — SIGNIFICANT CHANGE UP
SODIUM SERPL-SCNC: 140 MMOL/L — SIGNIFICANT CHANGE UP (ref 135–145)
WBC # BLD: 9.96 K/UL — SIGNIFICANT CHANGE UP (ref 3.8–10.5)
WBC # FLD AUTO: 9.96 K/UL — SIGNIFICANT CHANGE UP (ref 3.8–10.5)

## 2024-04-27 PROCEDURE — 71045 X-RAY EXAM CHEST 1 VIEW: CPT | Mod: 26,77

## 2024-04-27 PROCEDURE — 99233 SBSQ HOSP IP/OBS HIGH 50: CPT

## 2024-04-27 PROCEDURE — 71045 X-RAY EXAM CHEST 1 VIEW: CPT | Mod: 26

## 2024-04-27 RX ORDER — SODIUM CHLORIDE 9 MG/ML
4 INJECTION INTRAMUSCULAR; INTRAVENOUS; SUBCUTANEOUS EVERY 6 HOURS
Refills: 0 | Status: DISCONTINUED | OUTPATIENT
Start: 2024-04-27 | End: 2024-04-29

## 2024-04-27 RX ORDER — HYDROMORPHONE HYDROCHLORIDE 2 MG/ML
2 INJECTION INTRAMUSCULAR; INTRAVENOUS; SUBCUTANEOUS
Refills: 0 | Status: DISCONTINUED | OUTPATIENT
Start: 2024-04-27 | End: 2024-04-29

## 2024-04-27 RX ORDER — ACETAMINOPHEN 500 MG
1000 TABLET ORAL ONCE
Refills: 0 | Status: DISCONTINUED | OUTPATIENT
Start: 2024-04-27 | End: 2024-04-27

## 2024-04-27 RX ORDER — TRAMADOL HYDROCHLORIDE 50 MG/1
25 TABLET ORAL EVERY 8 HOURS
Refills: 0 | Status: DISCONTINUED | OUTPATIENT
Start: 2024-04-27 | End: 2024-04-29

## 2024-04-27 RX ORDER — ACETAMINOPHEN 500 MG
650 TABLET ORAL EVERY 6 HOURS
Refills: 0 | Status: DISCONTINUED | OUTPATIENT
Start: 2024-04-27 | End: 2024-04-29

## 2024-04-27 RX ADMIN — Medication 650 MILLIGRAM(S): at 17:10

## 2024-04-27 RX ADMIN — DORNASE ALFA 2.5 MILLIGRAM(S): 1 SOLUTION RESPIRATORY (INHALATION) at 10:54

## 2024-04-27 RX ADMIN — HEPARIN SODIUM 5000 UNIT(S): 5000 INJECTION INTRAVENOUS; SUBCUTANEOUS at 05:17

## 2024-04-27 RX ADMIN — Medication 650 MILLIGRAM(S): at 05:18

## 2024-04-27 RX ADMIN — SODIUM CHLORIDE 30 MILLILITER(S): 9 INJECTION, SOLUTION INTRAVENOUS at 05:18

## 2024-04-27 RX ADMIN — LIDOCAINE 1 PATCH: 4 CREAM TOPICAL at 23:57

## 2024-04-27 RX ADMIN — HEPARIN SODIUM 5000 UNIT(S): 5000 INJECTION INTRAVENOUS; SUBCUTANEOUS at 23:59

## 2024-04-27 RX ADMIN — TRAMADOL HYDROCHLORIDE 25 MILLIGRAM(S): 50 TABLET ORAL at 10:43

## 2024-04-27 RX ADMIN — Medication 650 MILLIGRAM(S): at 23:59

## 2024-04-27 RX ADMIN — Medication 200 MILLILITER(S): at 05:18

## 2024-04-27 RX ADMIN — LIDOCAINE 1 PATCH: 4 CREAM TOPICAL at 07:24

## 2024-04-27 RX ADMIN — TRAMADOL HYDROCHLORIDE 25 MILLIGRAM(S): 50 TABLET ORAL at 21:34

## 2024-04-27 RX ADMIN — TRAMADOL HYDROCHLORIDE 25 MILLIGRAM(S): 50 TABLET ORAL at 11:45

## 2024-04-27 RX ADMIN — SODIUM CHLORIDE 4 MILLILITER(S): 9 INJECTION INTRAMUSCULAR; INTRAVENOUS; SUBCUTANEOUS at 16:11

## 2024-04-27 RX ADMIN — TRAMADOL HYDROCHLORIDE 25 MILLIGRAM(S): 50 TABLET ORAL at 20:04

## 2024-04-27 RX ADMIN — ALBUTEROL 2.5 MILLIGRAM(S): 90 AEROSOL, METERED ORAL at 03:30

## 2024-04-27 RX ADMIN — LIDOCAINE 1 PATCH: 4 CREAM TOPICAL at 09:00

## 2024-04-27 RX ADMIN — Medication 650 MILLIGRAM(S): at 12:34

## 2024-04-27 RX ADMIN — CYCLOBENZAPRINE HYDROCHLORIDE 2.5 MILLIGRAM(S): 10 TABLET, FILM COATED ORAL at 15:08

## 2024-04-27 RX ADMIN — ALBUTEROL 2.5 MILLIGRAM(S): 90 AEROSOL, METERED ORAL at 10:54

## 2024-04-27 RX ADMIN — HEPARIN SODIUM 5000 UNIT(S): 5000 INJECTION INTRAVENOUS; SUBCUTANEOUS at 15:09

## 2024-04-27 RX ADMIN — Medication 650 MILLIGRAM(S): at 00:55

## 2024-04-27 RX ADMIN — CYCLOBENZAPRINE HYDROCHLORIDE 2.5 MILLIGRAM(S): 10 TABLET, FILM COATED ORAL at 23:58

## 2024-04-27 RX ADMIN — ALBUTEROL 2.5 MILLIGRAM(S): 90 AEROSOL, METERED ORAL at 16:11

## 2024-04-27 NOTE — DISCHARGE NOTE PROVIDER - CARE PROVIDERS DIRECT ADDRESSES
[email or web address]

## 2024-04-27 NOTE — DISCHARGE NOTE PROVIDER - NSDCFUADDINST_GEN_ALL_CORE_FT
Keep the chest tube site clean with soap and water.  Allow it to dry and leave it open to air as much as possible.  Some drainage is normal but watch for pus, increasing redness, fevers, difficulty breathing or other unusual symptoms and if noted, call Dr Guan.  The suture will come out at the office.  Wear your [place] J collar when out of bed and wear your soft collar at other times.     You may keep all wounds uncovered and shower daily. Some drainage is normal but watch for pus, increasing redness, fevers, difficulty breathing or other unusual symptoms and if noted, call Dr Guan.  The suture will come out at the office. Continue to use incentive spirometer.   Wear your [place] J collar when out of bed and wear your soft collar at other times.

## 2024-04-27 NOTE — DISCHARGE NOTE PROVIDER - NSDCCPCAREPLAN_GEN_ALL_CORE_FT
PRINCIPAL DISCHARGE DIAGNOSIS  Diagnosis: Pancoast tumor, left  Assessment and Plan of Treatment:

## 2024-04-27 NOTE — DISCHARGE NOTE PROVIDER - NSDCFUADDAPPT_GEN_ALL_CORE_FT
Follow up with neurosurgery.  See Dr Guan in 2 weeks.  Call for an appointment and bring a new chest X-ray with you.  See your PCP as well in about a week or two.     Follow up with neurosurgery, with Ortho Spine specialist in [facility] in [place], Dr Barger.   See Dr Guan in 2 weeks.  Call for an appointment [phone] and bring a new chest X-ray with you.  See your PCP as well in about a week or two.

## 2024-04-27 NOTE — DISCHARGE NOTE PROVIDER - HOSPITAL COURSE
68 year old female  underwent FB, L THoracotomy, Resection of Pancoast tumor, and SAMRA wedge & L 1 to 3 rib resection, chest wall reconstruction, and MLND on 4/23/24.  Neurosurgery  had been following and a CT scan showed a C6 fx that required a [place] J collar while OOB.  The Chest tube was removed on 4/26 and the pt was for discharge. 68 year old female  underwent FB, L THoracotomy, Resection of Pancoast tumor, and SAMRA wedge & L 1 to 3 rib resection, chest wall reconstruction, and MLND on 4/23/24.  Neurosurgery  had been following and a CT scan showed a C6 fx that required a [place] J collar while OOB.  The Chest tube was removed on 4/26. Pt remained with a stable CXr.  Pt today pain controlled. Thoracotomy c/d/i. Must keep cervical collar in place. No CP or SOB. Cleared for d/c to  rehab by Dr. Guan.

## 2024-04-27 NOTE — DISCHARGE NOTE PROVIDER - CARE PROVIDER_API CALL
Maurice Guan  Thoracic Surgery  [id number] [place] [address], [address] [place] [address]  Phone: [phone]  Fax: [phone]  Established Patient  Follow Up Time: 2 weeks

## 2024-04-27 NOTE — DISCHARGE NOTE PROVIDER - NSDCCPTREATMENT_GEN_ALL_CORE_FT
PRINCIPAL PROCEDURE  Procedure: Surgical removal of Pancoast tumor  Findings and Treatment: FB, Left Thoracotomy w/ resection of pancoast tumor and SAMRA wedge, resection of chest wall(L 1-3rd tribs) w/ Reconstruction w. RENETTA jiang

## 2024-04-27 NOTE — DISCHARGE NOTE PROVIDER - NSDCMRMEDTOKEN_GEN_ALL_CORE_FT
Home Oxygen condenser, portable tank and tubinL/min  Tylenol 500 mg oral tablet: 2 tab(s) orally as needed   acetaminophen 325 mg oral tablet: 2 tab(s) orally every 6 hours as needed for  mild pain  HYDROmorphone 2 mg oral tablet: 1 tab(s) orally every 3 hours As needed Moderate to Severe Pain (4 - 10)  ipratropium-albuterol 0.5 mg-2.5 mg/3 mL inhalation solution: 3 milliliter(s) by nebulizer 3 times a day when awake  polyethylene glycol 3350 oral powder for reconstitution: 17 gram(s) orally once a day  senna leaf extract oral tablet: 2 tab(s) orally once a day (at bedtime)

## 2024-04-28 LAB
ANION GAP SERPL CALC-SCNC: 13 MMOL/L — SIGNIFICANT CHANGE UP (ref 7–14)
APTT BLD: 30.3 SEC — SIGNIFICANT CHANGE UP (ref 24.5–35.6)
BUN SERPL-MCNC: 20 MG/DL — SIGNIFICANT CHANGE UP (ref 7–23)
CALCIUM SERPL-MCNC: 8.8 MG/DL — SIGNIFICANT CHANGE UP (ref 8.4–10.5)
CHLORIDE SERPL-SCNC: 101 MMOL/L — SIGNIFICANT CHANGE UP (ref 98–107)
CO2 SERPL-SCNC: 26 MMOL/L — SIGNIFICANT CHANGE UP (ref 22–31)
CREAT SERPL-MCNC: 0.57 MG/DL — SIGNIFICANT CHANGE UP (ref 0.5–1.3)
EGFR: 98 ML/MIN/1.73M2 — SIGNIFICANT CHANGE UP
GLUCOSE SERPL-MCNC: 105 MG/DL — HIGH (ref 70–99)
HCT VFR BLD CALC: 29.1 % — LOW (ref 34.5–45)
HGB BLD-MCNC: 9.2 G/DL — LOW (ref 11.5–15.5)
INR BLD: 0.95 RATIO — SIGNIFICANT CHANGE UP (ref 0.85–1.18)
MAGNESIUM SERPL-MCNC: 2 MG/DL — SIGNIFICANT CHANGE UP (ref 1.6–2.6)
MCHC RBC-ENTMCNC: 28.9 PG — SIGNIFICANT CHANGE UP (ref 27–34)
MCHC RBC-ENTMCNC: 31.6 GM/DL — LOW (ref 32–36)
MCV RBC AUTO: 91.5 FL — SIGNIFICANT CHANGE UP (ref 80–100)
NRBC # BLD: 0 /100 WBCS — SIGNIFICANT CHANGE UP (ref 0–0)
NRBC # FLD: 0 K/UL — SIGNIFICANT CHANGE UP (ref 0–0)
PHOSPHATE SERPL-MCNC: 3.7 MG/DL — SIGNIFICANT CHANGE UP (ref 2.5–4.5)
PLATELET # BLD AUTO: 406 K/UL — HIGH (ref 150–400)
POTASSIUM SERPL-MCNC: 4.2 MMOL/L — SIGNIFICANT CHANGE UP (ref 3.5–5.3)
POTASSIUM SERPL-SCNC: 4.2 MMOL/L — SIGNIFICANT CHANGE UP (ref 3.5–5.3)
PROTHROM AB SERPL-ACNC: 10.7 SEC — SIGNIFICANT CHANGE UP (ref 9.5–13)
RBC # BLD: 3.18 M/UL — LOW (ref 3.8–5.2)
RBC # FLD: 14.6 % — HIGH (ref 10.3–14.5)
SODIUM SERPL-SCNC: 140 MMOL/L — SIGNIFICANT CHANGE UP (ref 135–145)
WBC # BLD: 9.08 K/UL — SIGNIFICANT CHANGE UP (ref 3.8–10.5)
WBC # FLD AUTO: 9.08 K/UL — SIGNIFICANT CHANGE UP (ref 3.8–10.5)

## 2024-04-28 PROCEDURE — 71045 X-RAY EXAM CHEST 1 VIEW: CPT | Mod: 26

## 2024-04-28 RX ADMIN — HEPARIN SODIUM 5000 UNIT(S): 5000 INJECTION INTRAVENOUS; SUBCUTANEOUS at 22:44

## 2024-04-28 RX ADMIN — LIDOCAINE 1 PATCH: 4 CREAM TOPICAL at 07:00

## 2024-04-28 RX ADMIN — TRAMADOL HYDROCHLORIDE 25 MILLIGRAM(S): 50 TABLET ORAL at 23:30

## 2024-04-28 RX ADMIN — TRAMADOL HYDROCHLORIDE 25 MILLIGRAM(S): 50 TABLET ORAL at 06:45

## 2024-04-28 RX ADMIN — HEPARIN SODIUM 5000 UNIT(S): 5000 INJECTION INTRAVENOUS; SUBCUTANEOUS at 13:27

## 2024-04-28 RX ADMIN — ALBUTEROL 2.5 MILLIGRAM(S): 90 AEROSOL, METERED ORAL at 03:57

## 2024-04-28 RX ADMIN — SODIUM CHLORIDE 4 MILLILITER(S): 9 INJECTION INTRAMUSCULAR; INTRAVENOUS; SUBCUTANEOUS at 03:52

## 2024-04-28 RX ADMIN — Medication 650 MILLIGRAM(S): at 18:20

## 2024-04-28 RX ADMIN — CYCLOBENZAPRINE HYDROCHLORIDE 2.5 MILLIGRAM(S): 10 TABLET, FILM COATED ORAL at 20:53

## 2024-04-28 RX ADMIN — Medication 650 MILLIGRAM(S): at 23:55

## 2024-04-28 RX ADMIN — SODIUM CHLORIDE 4 MILLILITER(S): 9 INJECTION INTRAMUSCULAR; INTRAVENOUS; SUBCUTANEOUS at 21:39

## 2024-04-28 RX ADMIN — TRAMADOL HYDROCHLORIDE 25 MILLIGRAM(S): 50 TABLET ORAL at 22:43

## 2024-04-28 RX ADMIN — HEPARIN SODIUM 5000 UNIT(S): 5000 INJECTION INTRAVENOUS; SUBCUTANEOUS at 06:18

## 2024-04-28 RX ADMIN — ALBUTEROL 2.5 MILLIGRAM(S): 90 AEROSOL, METERED ORAL at 14:43

## 2024-04-28 RX ADMIN — Medication 650 MILLIGRAM(S): at 09:58

## 2024-04-28 RX ADMIN — Medication 650 MILLIGRAM(S): at 23:20

## 2024-04-28 RX ADMIN — LIDOCAINE 1 PATCH: 4 CREAM TOPICAL at 12:22

## 2024-04-28 RX ADMIN — DORNASE ALFA 2.5 MILLIGRAM(S): 1 SOLUTION RESPIRATORY (INHALATION) at 09:48

## 2024-04-28 RX ADMIN — Medication 650 MILLIGRAM(S): at 00:14

## 2024-04-28 RX ADMIN — TRAMADOL HYDROCHLORIDE 25 MILLIGRAM(S): 50 TABLET ORAL at 15:01

## 2024-04-28 RX ADMIN — TRAMADOL HYDROCHLORIDE 25 MILLIGRAM(S): 50 TABLET ORAL at 06:15

## 2024-04-28 RX ADMIN — Medication 650 MILLIGRAM(S): at 17:45

## 2024-04-28 RX ADMIN — SODIUM CHLORIDE 4 MILLILITER(S): 9 INJECTION INTRAMUSCULAR; INTRAVENOUS; SUBCUTANEOUS at 09:46

## 2024-04-28 RX ADMIN — ALBUTEROL 2.5 MILLIGRAM(S): 90 AEROSOL, METERED ORAL at 09:45

## 2024-04-28 RX ADMIN — Medication 650 MILLIGRAM(S): at 09:28

## 2024-04-28 RX ADMIN — ALBUTEROL 2.5 MILLIGRAM(S): 90 AEROSOL, METERED ORAL at 21:39

## 2024-04-28 RX ADMIN — TRAMADOL HYDROCHLORIDE 25 MILLIGRAM(S): 50 TABLET ORAL at 14:31

## 2024-04-29 ENCOUNTER — NON-APPOINTMENT (OUTPATIENT)
Age: 69
End: 2024-04-29

## 2024-04-29 ENCOUNTER — TRANSCRIPTION ENCOUNTER (OUTPATIENT)
Age: 69
End: 2024-04-29

## 2024-04-29 VITALS
TEMPERATURE: 98 F | DIASTOLIC BLOOD PRESSURE: 52 MMHG | RESPIRATION RATE: 18 BRPM | OXYGEN SATURATION: 100 % | SYSTOLIC BLOOD PRESSURE: 129 MMHG | HEART RATE: 93 BPM

## 2024-04-29 LAB — SARS-COV-2 RNA SPEC QL NAA+PROBE: SIGNIFICANT CHANGE UP

## 2024-04-29 RX ORDER — SENNA PLUS 8.6 MG/1
2 TABLET ORAL
Qty: 0 | Refills: 0 | DISCHARGE
Start: 2024-04-29

## 2024-04-29 RX ORDER — IPRATROPIUM/ALBUTEROL SULFATE 18-103MCG
3 AEROSOL WITH ADAPTER (GRAM) INHALATION
Qty: 0 | Refills: 0 | DISCHARGE

## 2024-04-29 RX ORDER — ACETAMINOPHEN 500 MG
2 TABLET ORAL
Qty: 0 | Refills: 0 | DISCHARGE
Start: 2024-04-29

## 2024-04-29 RX ORDER — ACETAMINOPHEN 500 MG
2 TABLET ORAL
Refills: 0 | DISCHARGE

## 2024-04-29 RX ORDER — POLYETHYLENE GLYCOL 3350 17 G/17G
17 POWDER, FOR SOLUTION ORAL
Qty: 0 | Refills: 0 | DISCHARGE
Start: 2024-04-29

## 2024-04-29 RX ORDER — HYDROMORPHONE HYDROCHLORIDE 2 MG/ML
1 INJECTION INTRAMUSCULAR; INTRAVENOUS; SUBCUTANEOUS
Qty: 0 | Refills: 0 | DISCHARGE
Start: 2024-04-29

## 2024-04-29 RX ADMIN — SODIUM CHLORIDE 4 MILLILITER(S): 9 INJECTION INTRAMUSCULAR; INTRAVENOUS; SUBCUTANEOUS at 09:40

## 2024-04-29 RX ADMIN — DORNASE ALFA 2.5 MILLIGRAM(S): 1 SOLUTION RESPIRATORY (INHALATION) at 09:40

## 2024-04-29 RX ADMIN — SODIUM CHLORIDE 4 MILLILITER(S): 9 INJECTION INTRAMUSCULAR; INTRAVENOUS; SUBCUTANEOUS at 15:52

## 2024-04-29 RX ADMIN — Medication 650 MILLIGRAM(S): at 17:51

## 2024-04-29 RX ADMIN — ALBUTEROL 2.5 MILLIGRAM(S): 90 AEROSOL, METERED ORAL at 15:52

## 2024-04-29 RX ADMIN — HEPARIN SODIUM 5000 UNIT(S): 5000 INJECTION INTRAVENOUS; SUBCUTANEOUS at 05:35

## 2024-04-29 RX ADMIN — Medication 650 MILLIGRAM(S): at 05:35

## 2024-04-29 RX ADMIN — HEPARIN SODIUM 5000 UNIT(S): 5000 INJECTION INTRAVENOUS; SUBCUTANEOUS at 16:45

## 2024-04-29 RX ADMIN — Medication 650 MILLIGRAM(S): at 11:40

## 2024-04-29 RX ADMIN — CYCLOBENZAPRINE HYDROCHLORIDE 2.5 MILLIGRAM(S): 10 TABLET, FILM COATED ORAL at 05:35

## 2024-04-29 RX ADMIN — Medication 650 MILLIGRAM(S): at 17:20

## 2024-04-29 RX ADMIN — ALBUTEROL 2.5 MILLIGRAM(S): 90 AEROSOL, METERED ORAL at 09:39

## 2024-04-29 RX ADMIN — ALBUTEROL 2.5 MILLIGRAM(S): 90 AEROSOL, METERED ORAL at 03:12

## 2024-04-29 RX ADMIN — Medication 650 MILLIGRAM(S): at 12:10

## 2024-04-29 RX ADMIN — SODIUM CHLORIDE 4 MILLILITER(S): 9 INJECTION INTRAMUSCULAR; INTRAVENOUS; SUBCUTANEOUS at 03:12

## 2024-04-29 RX ADMIN — TRAMADOL HYDROCHLORIDE 25 MILLIGRAM(S): 50 TABLET ORAL at 12:10

## 2024-04-29 RX ADMIN — TRAMADOL HYDROCHLORIDE 25 MILLIGRAM(S): 50 TABLET ORAL at 11:40

## 2024-04-29 RX ADMIN — Medication 650 MILLIGRAM(S): at 06:44

## 2024-04-29 NOTE — DISCHARGE NOTE NURSING/CASE MANAGEMENT/SOCIAL WORK - NSDCFUADDAPPT_GEN_ALL_CORE_FT
Follow up with neurosurgery, with Ortho Spine specialist in [facility] in [place], Dr Barger.   See Dr Guan in 2 weeks.  Call for an appointment [phone] and bring a new chest X-ray with you.  See your PCP as well in about a week or two.

## 2024-04-29 NOTE — PROGRESS NOTE ADULT - SUBJECTIVE AND OBJECTIVE BOX
CHIEF COMPLAINT: FOLLOW UP IN ICU FOR POSTOPERATIVE CARE OF PATIENT WHO IS S/P LUNG RESECTION      PROCEDURES: Left Thoracotomy w/ resection of pancoast tumor and SAMRA wedge, resection of chest wall(L 1-3rd tribs) w/ Reconstruction wRENETTA riley 23-Apr-2024      ISSUES:   Lung cancer s/p neoadjuvant cisplatin, paclitaxel, and nivolumab   Post op pain  Chest tube in place  COPD  Former smoker (50 ppy, quit 1/2024)  Seasonal allergies    INTERVAL EVENTS:   Chest tube with no airleak.      HISTORY:   Patient reports moderate pain at chest wall incision sites which is worse with coughing and deep breathing without associated fever or dyspnea. Pain is improved with use of pain meds.     PHYSICAL EXAM:   Gen: Comfortable, No acute distress  Eyes: Sclera white, Conjunctiva normal, Eyelids normal, Pupils symmetrical   ENT: Mucous membranes moist,  ,  ,    Neck: Trachea midline,  ,  ,  ,  ,  ,    CV: Rate regular, Rhythm regular,  ,  ,    Resp: Breath sounds clear, No accessory muscles use, L chest tube in place,  ,    Abd: Soft, Non-distended, Non-tender,   ,  ,  ,    Skin: Warm, No peripheral edema of lower extremities,  ,    : No goddard  Neuro: Moving all 4 extremities,    Psych: A&Ox3      ASSESSMENT AND PLAN:     NEURO:  Post-operative Pain - Stable. Pain control with tramadol PO and dilaudid PO        RESPIRATORY:  Hypoxia - Wean nasal cannula for goal O2sat above 92. Obtain CXR. Incentive spirometry. Chest PT and frequent suctioning. Continue bronchodilators. OOB to chair & ambulate w/ assistance. Continuous pulse oximetry for support & to prevent decompensation.     COPD - worsened by recent thoracic surgery. Continue home inhalers.         CARDIOVASCULAR:  Hemodynamically stable - Not on pressors. Continue hemodynamic monitoring.  Telemetry (medical test) - Reviewed by me today independently. Normal sinus rhythm.          RENAL:  Stable - Monitor IOs and electrolytes. Keep K above 4.0 and Mg above 2.0.         GASTROINTESTINAL:  GI prophylaxis not indicated  Zofran and Reglan IV PRN for nausea  Regular consistency diet       HEMATOLOGIC:  No signs of active bleeding. Monitor Hgb in CBC in AM  DVT prophylaxis with heparin subQ         INFECTIOUS DISEASE:  No signs of active infection. Will monitor for fever and leukocytosis.          ENDOCRINE:  Stable – Monitor glucose fingersticks for goal 120-180.               ONCOLOGY:  Lung nodule - Improved. S/P resection. Follow up final pathology.   - Discontinue chest tube. Repeat CXR.        Pertinent clinical, laboratory, radiographic, hemodynamic, echocardiographic, respiratory data, microbiologic data and chart were reviewed by myself and analyzed frequently throughout the course of the day and night by myself.    Plan discussed at length with the CTICU staff and Attending CT Surgeon -   Dr Peg Vivas.       Patient's status was discussed with patient at bedside.       	  I have spent 50 minutes of time with this patient monitoring hemodynamic status, respiratory status, and coordinating care in the ICU.    ________________________________________________      _________________________  VITAL SIGNS:  Vital Signs Last 24 Hrs  T(C): 36.9 (27 Apr 2024 12:00), Max: 37.2 (26 Apr 2024 20:00)  T(F): 98.5 (27 Apr 2024 12:00), Max: 99 (26 Apr 2024 20:00)  HR: 95 (27 Apr 2024 12:00) (79 - 102)  BP: 124/68 (27 Apr 2024 12:00) (110/50 - 145/60)  BP(mean): 82 (27 Apr 2024 12:00) (67 - 106)  RR: 27 (27 Apr 2024 12:00) (12 - 28)  SpO2: 96% (27 Apr 2024 12:00) (86% - 100%)    Parameters below as of 27 Apr 2024 12:00  Patient On (Oxygen Delivery Method): nasal cannula w/ humidification  O2 Flow (L/min): 2    I/Os:   I&O's Detail    26 Apr 2024 07:01  -  27 Apr 2024 07:00  --------------------------------------------------------  IN:    Lactated Ringers: 720 mL    Oral Fluid: 200 mL  Total IN: 920 mL    OUT:    Chest Tube (mL): 10 mL    Chest Tube (mL): 165 mL    Voided (mL): 1750 mL  Total OUT: 1925 mL    Total NET: -1005 mL      27 Apr 2024 07:01  -  27 Apr 2024 14:18  --------------------------------------------------------  IN:    Lactated Ringers: 30 mL    Oral Fluid: 500 mL  Total IN: 530 mL    OUT:    Chest Tube (mL): 0 mL    Voided (mL): 300 mL  Total OUT: 300 mL    Total NET: 230 mL              MEDICATIONS:  MEDICATIONS  (STANDING):  acetaminophen     Tablet .. 650 milliGRAM(s) Oral every 6 hours  albuterol    0.083% 2.5 milliGRAM(s) Nebulizer every 6 hours  dornase asa Solution 2.5 milliGRAM(s) Inhalation daily  heparin   Injectable 5000 Unit(s) SubCutaneous every 8 hours  lidocaine   4% Patch 1 Patch Transdermal daily  polyethylene glycol 3350 17 Gram(s) Oral daily  senna 2 Tablet(s) Oral at bedtime  sodium chloride 3%  Inhalation 4 milliLiter(s) Inhalation every 6 hours    MEDICATIONS  (PRN):  cyclobenzaprine 2.5 milliGRAM(s) Oral every 8 hours PRN Muscle Spasm  HYDROmorphone   Tablet 2 milliGRAM(s) Oral every 3 hours PRN Moderate to Severe Pain (4 - 10)  naloxone Injectable 0.1 milliGRAM(s) IV Push every 3 minutes PRN For ANY of the following changes in patient status:  A. RR LESS THAN 10 breaths per minute, B. Oxygen saturation LESS THAN 90%, C. Sedation score of 6  ondansetron Injectable 4 milliGRAM(s) IV Push every 6 hours PRN Nausea  traMADol 25 milliGRAM(s) Oral every 8 hours PRN Severe Pain (7 - 10)      LABS:  Laboratory data was independently reviewed by me today.                           8.8    9.96  )-----------( 319      ( 27 Apr 2024 05:00 )             27.3     04-27    140  |  104  |  17  ----------------------------<  102<H>  4.1   |  29  |  0.57    Ca    8.6      27 Apr 2024 05:00  Phos  2.6     04-27  Mg     2.00     04-27        PT/INR - ( 27 Apr 2024 05:00 )   PT: 10.9 sec;   INR: 0.97 ratio         PTT - ( 27 Apr 2024 05:00 )  PTT:32.4 sec    Urinalysis Basic - ( 27 Apr 2024 05:00 )    Color: x / Appearance: x / SG: x / pH: x  Gluc: 102 mg/dL / Ketone: x  / Bili: x / Urobili: x   Blood: x / Protein: x / Nitrite: x   Leuk Esterase: x / RBC: x / WBC x   Sq Epi: x / Non Sq Epi: x / Bacteria: x        RADIOLOGY:   Radiology images were independently reviewed by me today. Reports were reviewed by me today.    Xray Chest 1 View- PORTABLE-Urgent:   ACC: [id number] EXAM:  XR CHEST PORTABLE URGENT 1V   ORDERED BY: JANY GAN     PROCEDURE DATE:  04/27/2024          INTERPRETATION:  CLINICAL INDICATION: Removal of chest tube.    EXAM: Frontal radiograph of the chest.    COMPARISON: Chest radiograph from4/27/2024.    FINDINGS:  Interval removal of left chest tube. Linear lucency within the region of   prior chest tube.  Hazy opacification of the left lung apex similar to prior imaging.  Interval decrease in right pleural effusion.  No pneumothorax.  The heart is normal in size  The visualized osseous structures demonstrate no acute pathology.    IMPRESSION:  Linear lucency within the region of prior chest tube.  Interval decrease in right pleural effusion.  Stable hazy opacity in theleft lung apex.    --- End of Report ---          MARILY PAVON MD; Resident Radiologist  This document has been electronically signed.  JEFFY DSOUZA MD; Attending Radiologist  This document has been electronically signed. Apr 27 2024 12:29PM (04-27-24 @ 12:03)  Xray Chest 1 View- PORTABLE-Routine:   ACC: [id number] EXAM:  XR CHEST PORTABLE ROUTINE 1V   ORDERED BY: CAITLIN ROSSI     PROCEDURE DATE:  04/27/2024          INTERPRETATION:  HISTORY: Admitting Dxs: R91.8 R91.8;  s/p pancoast tumor   resection;  TECHNIQUE: Portable frontal view of the chest, 1 view.  COMPARISON: 4/26/2024.  FINDINGS/  IMPRESSION:  Chest tube extending to the left apex  HEART:  Enlarged.  LUNGS: Left peripheral apical opacity, stable. Small right effusion and   right basilar atelectasis. Stable..  BONES: degenerative changes    --- End of Report ---            SERGIO HOWELL MD; Attending Interventional Radiologist  This document has been electronically signed. Apr 27 2024 10:41AM (04-27-24 @ 06:50)  Xray Chest 1 View- PORTABLE-Urgent:   ACC: [id number] EXAM:  XR CHEST PORTABLE URGENT 1V   ORDERED BY: CAITLIN ROSSI     PROCEDURE DATE:  04/26/2024          INTERPRETATION:  INDICATION: Removal of left chest tube    FINDINGS: Portable chest 2:35 PM    COMPARISON: 12:30 PM    FINDINGS:  Heart/Vascular: The heart size, mediastinum, hilum and aorta are stable.  Pulmonary: Midline trachea. There is no pneumothorax after removal of the   chest tube. Partial clearing of right pleural effusion/atelectasis.   Surgical changes left apex.  Bones: There is no fracture.  Lines and catheter: The medially positioned left chest tube was removed.    Impression:    There is no pneumothorax after removal of the chest tube.  Partial clearing of right pleural effusion/atelectasis.    --- End of Report ---            DESIREE HALE DO; Attending Radiologist  This document has been electronically signed. Apr 26 2024  2:57PM (04-26-24 @ 14:48)  
Anesthesia Pain Management Service    SUBJECTIVE: Pt doing well with PCEA without problems reported.    Therapy:	  [ ] IV PCA	   [ X] Epidural           [ ] s/p Spinal Opoid              [ ] Postpartum infusion	  [ ] Patient controlled regional anesthesia (PCRA)    [ ] prn Analgesics    Allergies    No Known Allergies    Intolerances      MEDICATIONS  (STANDING):  acetaminophen     Tablet .. 650 milliGRAM(s) Oral every 6 hours  albuterol    0.083% 2.5 milliGRAM(s) Nebulizer every 6 hours  dornase asa Solution 2.5 milliGRAM(s) Inhalation daily  heparin   Injectable 5000 Unit(s) SubCutaneous every 8 hours  lactated ringers. 1000 milliLiter(s) (30 mL/Hr) IV Continuous <Continuous>  lidocaine   4% Patch 1 Patch Transdermal daily  polyethylene glycol 3350 17 Gram(s) Oral daily  ropivacaine 0.2% in Sodium Chloride PCEA 200 milliLiter(s) Epidural PCA Continuous  senna 2 Tablet(s) Oral at bedtime  sodium chloride 3%  Inhalation 4 milliLiter(s) Inhalation every 12 hours    MEDICATIONS  (PRN):  cyclobenzaprine 2.5 milliGRAM(s) Oral every 8 hours PRN Muscle Spasm  HYDROmorphone  Injectable 0.2 milliGRAM(s) IV Push every 3 hours PRN Severe breakthrough Pain (7 - 10)  naloxone Injectable 0.1 milliGRAM(s) IV Push every 3 minutes PRN For ANY of the following changes in patient status:  A. RR LESS THAN 10 breaths per minute, B. Oxygen saturation LESS THAN 90%, C. Sedation score of 6  ondansetron Injectable 4 milliGRAM(s) IV Push every 6 hours PRN Nausea      OBJECTIVE:   [X] No new signs     [ ] Other:    Side Effects:  [X ] None			[ ] Other:    Assessment of Catheter Site:		[ X] Intact		[ ] Other:    ASSESSMENT/PLAN  [ X] Continue current therapy    [ ] Therapy changed to:    [ ] IV PCA       [ ] Epidural     [ ] prn Analgesics     Comments: Continue current PCEA settings.
Anesthesia Pain Management Service: Day 3__ of Epidural    SUBJECTIVE: Patient is complaining of pain on her neck area from a fracture and she states it feels like a stiff neck.  Patient currently has a soft neck brace.  She is also having some post surgical pain with activity in her back and at the chest tube insertion site.   Pain Scale Score:  8/10 at her neck area  Refer to charted pain scores    THERAPY:  [  ] Epidural Bupivacaine 0.0625% and Hydromorphone  		[ X] 10 micrograms/mL	[ ] 5 micrograms/mL  [ ] Epidural Bupivacaine 0.0625% and Fentanyl - 2 micrograms/mL  [x ] Epidural Ropivacaine 0.2% plain – 1 mg/mL  [ ] Patient Controlled Regional Anesthesia (PCRA) Ropivacaine  		[ ] 0.2%			[ ] 0.1%    Demand dose __3_ lockout __15_ (minutes) Continuous Rate 4ml/hr___ Total: _46.2___ ml used (in past 24 hours)      MEDICATIONS  (STANDING):  acetaminophen     Tablet .. 650 milliGRAM(s) Oral every 6 hours  albuterol    0.083% 2.5 milliGRAM(s) Nebulizer every 6 hours  heparin   Injectable 5000 Unit(s) SubCutaneous every 8 hours  lactated ringers. 1000 milliLiter(s) (30 mL/Hr) IV Continuous <Continuous>  lidocaine   4% Patch 1 Patch Transdermal daily  phenylephrine    Infusion 0.5 MICROgram(s)/kG/Min (14.5 mL/Hr) IV Continuous <Continuous>  polyethylene glycol 3350 17 Gram(s) Oral daily  ropivacaine 0.2% in Sodium Chloride PCEA 200 milliLiter(s) Epidural PCA Continuous  senna 2 Tablet(s) Oral at bedtime    MEDICATIONS  (PRN):  cyclobenzaprine 2.5 milliGRAM(s) Oral every 8 hours PRN Muscle Spasm  HYDROmorphone  Injectable 0.2 milliGRAM(s) IV Push every 3 hours PRN Severe breakthrough Pain (7 - 10)  naloxone Injectable 0.1 milliGRAM(s) IV Push every 3 minutes PRN For ANY of the following changes in patient status:  A. RR LESS THAN 10 breaths per minute, B. Oxygen saturation LESS THAN 90%, C. Sedation score of 6  ondansetron Injectable 4 milliGRAM(s) IV Push every 6 hours PRN Nausea      OBJECTIVE:  Patient is sitting up in chair, with CT x2 and goddard.     Assessment of Catheter Site:	[ ] Left	[ ] Right  [x ] Epidural 	[ ] Femoral	      [ ] Saphenous   [ ] Supraclavicular   [ ] Other:    [x ] Dressing intact	[x ] Site non-tender	[ x] Site without erythema, discharge, edema  [x ] Epidural tubing and connection checked	[x] Gross neurological exam within normal limits  [ ] Catheter removed – tip intact		[ ] Afebrile  	[ ] Febrile: ___   [ X] see Temp under VS below)    PT/INR - ( 24 Apr 2024 03:23 )   PT: 11.2 sec;   INR: 0.99 ratio         PTT - ( 24 Apr 2024 03:23 )  PTT:30.4 sec                      9.6    13.38 )-----------( 309      ( 25 Apr 2024 02:50 )             30.0     Vital Signs Last 24 Hrs  T(C): 36.9 (04-25-24 @ 08:00), Max: 37.1 (04-24-24 @ 16:00)  T(F): 98.4 (04-25-24 @ 08:00), Max: 98.8 (04-24-24 @ 16:00)  HR: 95 (04-25-24 @ 11:00) (80 - 107)  BP: 110/51 (04-25-24 @ 11:00) (90/36 - 133/55)  BP(mean): 67 (04-25-24 @ 11:00) (51 - 78)  RR: 20 (04-25-24 @ 11:00) (14 - 27)  SpO2: 96% (04-25-24 @ 11:00) (92% - 100%)      Sedation Score:	[x ] Alert	[ ] Drowsy	[ ] Arousable	[ ] Asleep	[ ] Unresponsive    Side Effects:	[x ] None	[ ] Nausea	[ ] Vomiting	[ ] Pruritus  		[ ] Weakness		[ ] Numbness	[ ] Other:    ASSESSMENT/ PLAN:    Therapy to  be:	[x ] Continue   [ ] Discontinued   [ ] Change to prn Analgesics    Documentation and Verification of current medications:  [ X ] Done	[ ] Not done, not eligible, reason:    Comments:  Overnight, the patient was having pruritus and epidural solution order changed from Dilaudid with Bupivacaine to just Ropivacaine.  Pruritus has subsided once new solution started.  Doing OK with epidural and may continue. Increased continuous rate to 6ml/hr and 4 hour limit to 40ml, added Flexeril 2.5mg q 8 hours PRN for muscle spasm and IV Dilaudid PRN for breakthrough pain.  Recommend non-opioid adjuvant analgesics to be used when possible and when allowed by primary surgical team.    Progress Note written now but Patient was seen earlier.
Anesthesia Pain Management Service: Day _2_ of Epidural    SUBJECTIVE: Patient doing well with PCEA and no problems.  Pain Scale Score: 5/10  Refer to charted pain scores    THERAPY:  [x ] Epidural Bupivacaine 0.0625% and Hydromorphone  		[ X] 10 micrograms/mL	[ ] 5 micrograms/mL  [ ] Epidural Bupivacaine 0.0625% and Fentanyl - 2 micrograms/mL  [ ] Epidural Ropivacaine 0.1% plain – 1 mg/mL  [ ] Patient Controlled Regional Anesthesia (PCRA) Ropivacaine  		[ ] 0.2%			[ ] 0.1%    Demand dose __3_ lockout __15_ (minutes) Continuous Rate _4ml/hr__ Total: _66.8___ ml used (in past 24 hours)      MEDICATIONS  (STANDING):  albuterol    0.083% 2.5 milliGRAM(s) Nebulizer every 6 hours  heparin   Injectable 5000 Unit(s) SubCutaneous every 8 hours  hydromorphone (10 MICROgram(s)/mL) + bupivacaine 0.0625% in 0.9% Sodium Chloride PCEA 250 milliLiter(s) Epidural PCA Continuous  lactated ringers. 1000 milliLiter(s) (30 mL/Hr) IV Continuous <Continuous>  lidocaine   4% Patch 1 Patch Transdermal daily  phenylephrine    Infusion 0.5 MICROgram(s)/kG/Min (14.5 mL/Hr) IV Continuous <Continuous>  polyethylene glycol 3350 17 Gram(s) Oral daily  senna 2 Tablet(s) Oral at bedtime    MEDICATIONS  (PRN):  acetaminophen   IVPB .. 1000 milliGRAM(s) IV Intermittent once PRN Mild Pain (1 - 3)  hydromorphone (10 MICROgram(s)/mL) + bupivacaine 0.0625% in 0.9% Sodium Chloride PCEA Rescue Clinician  Bolus 5 milliLiter(s) Epidural every 15 minutes PRN for Pain Score greater than 6  naloxone Injectable 0.1 milliGRAM(s) IV Push every 3 minutes PRN For ANY of the following changes in patient status:  A. RR LESS THAN 10 breaths per minute, B. Oxygen saturation LESS THAN 90%, C. Sedation score of 6  ondansetron Injectable 4 milliGRAM(s) IV Push every 6 hours PRN Nausea      OBJECTIVE:  Patient is sitting up in bed with CT x2.    Assessment of Catheter Site:	[ ] Left	[ ] Right  [x ] Epidural 	[ ] Femoral	      [ ] Saphenous   [ ] Supraclavicular   [ ] Other:    [x ] Dressing intact	[x ] Site non-tender	[ x] Site without erythema, discharge, edema  [x ] Epidural tubing and connection checked	[x] Gross neurological exam within normal limits  [ ] Catheter removed – tip intact		[ ] Afebrile  	[ ] Febrile: ___   [ X] see Temp under VS below)    PT/INR - ( 24 Apr 2024 03:23 )   PT: 11.2 sec;   INR: 0.99 ratio         PTT - ( 24 Apr 2024 03:23 )  PTT:30.4 sec                      9.2    16.21 )-----------( 297      ( 24 Apr 2024 03:00 )             28.9     Vital Signs Last 24 Hrs  T(C): 36.7 (04-24-24 @ 12:00), Max: 36.9 (04-24-24 @ 04:00)  T(F): 98.1 (04-24-24 @ 12:00), Max: 98.4 (04-24-24 @ 04:00)  HR: 103 (04-24-24 @ 13:15) (32 - 103)  BP: 107/55 (04-24-24 @ 13:15) (66/43 - 118/49)  BP(mean): 68 (04-24-24 @ 13:15) (50 - 95)  RR: 16 (04-24-24 @ 13:15) (12 - 25)  SpO2: 96% (04-24-24 @ 13:15) (89% - 99%)      Sedation Score:	[x ] Alert	[ ] Drowsy	[ ] Arousable	[ ] Asleep	[ ] Unresponsive    Side Effects:	[x ] None	[ ] Nausea	[ ] Vomiting	[ ] Pruritus  		[ ] Weakness		[ ] Numbness	[ ] Other:    ASSESSMENT/ PLAN:    Therapy to  be:	[x ] Continue   [ ] Discontinued   [ ] Change to prn Analgesics    Documentation and Verification of current medications:  [ X ] Done	[ ] Not done, not eligible, reason:    Comments: Doing OK with epidural and may continue.  Recommend non-opioid adjuvant analgesics to be used when possible and when allowed by primary surgical team.    Progress Note written now but Patient was seen earlier.
Anesthesia Pain Management Service: Post Op Day _3_ of Epidural    SUBJECTIVE: Patient reports pain on  her upper back with movements. Patient states she is unsure if the PCEA helps much with the pain. Denies headache, numbness and tingling.   Pain Scale Score: 7-8/10  THERAPY:  [ ] Epidural Bupivacaine 0.0625% and Hydromorphone  		[ X] 10 micrograms/mL	[ ] 5 micrograms/mL  [ ] Epidural Bupivacaine 0.0625% and Fentanyl - 2 micrograms/mL  [x ] Epidural Ropivacaine 0.2% plain – 1 mg/mL  [ ] Patient Controlled Regional Anesthesia (PCRA) Ropivacaine  		[ ] 0.2%			[ ] 0.1%    Demand dose __3_ lockout __15_ (minutes) Continuous Rate 6___ Total: __124.8__ ml used (in past 24 hours)      MEDICATIONS  (STANDING):  acetaminophen     Tablet .. 650 milliGRAM(s) Oral every 6 hours  albuterol    0.083% 2.5 milliGRAM(s) Nebulizer every 6 hours  dornase asa Solution 2.5 milliGRAM(s) Inhalation daily  heparin   Injectable 5000 Unit(s) SubCutaneous every 8 hours  lactated ringers. 1000 milliLiter(s) (30 mL/Hr) IV Continuous <Continuous>  lidocaine   4% Patch 1 Patch Transdermal daily  polyethylene glycol 3350 17 Gram(s) Oral daily  ropivacaine 0.2% in Sodium Chloride PCEA 200 milliLiter(s) Epidural PCA Continuous  senna 2 Tablet(s) Oral at bedtime  sodium chloride 3%  Inhalation 4 milliLiter(s) Inhalation every 12 hours    MEDICATIONS  (PRN):  cyclobenzaprine 2.5 milliGRAM(s) Oral every 8 hours PRN Muscle Spasm  HYDROmorphone  Injectable 0.2 milliGRAM(s) IV Push every 3 hours PRN Severe breakthrough Pain (7 - 10)  naloxone Injectable 0.1 milliGRAM(s) IV Push every 3 minutes PRN For ANY of the following changes in patient status:  A. RR LESS THAN 10 breaths per minute, B. Oxygen saturation LESS THAN 90%, C. Sedation score of 6  ondansetron Injectable 4 milliGRAM(s) IV Push every 6 hours PRN Nausea      OBJECTIVE: Patient sitting up in chair. CTX2 in place.    Assessment of Catheter Site:	[ ] Left	[ ] Right  [x ] Epidural 	[ ] Femoral	      [ ] Saphenous   [ ] Supraclavicular   [ ] Other:    [x ] Dressing intact	[x ] Site non-tender	[ x] Site without erythema, discharge, edema  [x ] Epidural tubing and connection checked	[x] Gross neurological exam within normal limits  [ ] Catheter removed – tip intact		[ ] Afebrile  	[ ] Febrile: ___   [ X] see Temp under VS below)    PT/INR - ( 26 Apr 2024 06:00 )   PT: 10.6 sec;   INR: 0.94 ratio         PTT - ( 26 Apr 2024 06:00 )  PTT:29.8 sec                      8.7    12.64 )-----------( 298      ( 26 Apr 2024 03:56 )             27.6     Vital Signs Last 24 Hrs  T(C): 36.9 (04-26-24 @ 08:00), Max: 37.6 (04-26-24 @ 00:00)  T(F): 98.5 (04-26-24 @ 08:00), Max: 99.6 (04-26-24 @ 00:00)  HR: 91 (04-26-24 @ 11:00) (85 - 109)  BP: 116/69 (04-26-24 @ 11:00) (102/75 - 143/60)  BP(mean): 81 (04-26-24 @ 11:00) (67 - 85)  RR: 19 (04-26-24 @ 11:00) (16 - 27)  SpO2: 100% (04-26-24 @ 11:00) (93% - 100%)      Sedation Score:	[x ] Alert	[ ] Drowsy	[ ] Arousable	[ ] Asleep	[ ] Unresponsive    Side Effects:	[x ] None	[ ] Nausea	[ ] Vomiting	[ ] Pruritus  		[ ] Weakness		[ ] Numbness	[ ] Other:    ASSESSMENT/ PLAN:    Therapy to  be:	[x ] Continue   [ ] Discontinued   [ ] Change to prn Analgesics    Documentation and Verification of current medications:  [ X ] Done	[ ] Not done, not eligible, reason:    Comments: On dermatome check patient with coverage at T6 only on the right side. Continuos rate increased to 8ml/hr. IV Dilaudid 0.2mg PRN ordered for breakthrough pain.  Progress Note written now but Patient was seen earlier.
GODFREY ALVAREZ                     [id number]    HPI:  68 year old female with pmhx of COPD, Former smoker (1PPD x 50 years; Quit Jan 2024), November 2023 CT Chest revealing a SAMRA cavitary mass with adjacent rib destruction ans well as a 4 mm RLL nodule. 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 PET/CT (12/2023) showed SAMRA mass. Pt received 3 cycles of neoadjuvant treatment with cisplatin, paclitaxel and nivolumab. Followed by Oncologist Dr. Vineet Melendez.  Patient presents for pre-op evaluations for diagnosis of Other nonspecific abnormal finding of lung field. Patient is scheduled for Bronchoscopy, Mediastinoscopy, left thoracotomy, resection of pancoast tumor with left upper lobe lobectomy and chest wall reconstruction with epidural.   Denies cp, palpitations, dizziness, fevers, n/v/d/c. Patient has post nasal drip cough due to Seasonal allergies.   Patient had a fall 6 weeks ago and sustained a cervical fracture. Follows with Ortho Spine specialist in [facility] in [place], Dr Barger.  (17 Apr 2024 12:08)    CHIEF COMPLAINT: Follow up in ICU  for postoperative care of patient who is s/p lung surgery    Procedure:  Left Thoracotomy w/ resection of pancoast tumor and SAMRA wedge, resection of chest wall(L 1-3rd tribs) w/ Reconstruction w. gortex                       Issues:              Squamous cell carcinoma of Lung              Postop pain              Chest tube in place  H/O fracture of ankle  History of COPD  Status post ORIF of fracture of ankle  Cervical collar in place     PAST MEDICAL & SURGICAL HISTORY:  Seasonal allergies      H/O fracture of ankle      History of COPD      Other nonspecific abnormal finding of lung field      Status post ORIF of fracture of ankle                VITAL SIGNS:  Vital Signs Last 24 Hrs  T(C): 36.9 (25 Apr 2024 08:00), Max: 37.1 (24 Apr 2024 16:00)  T(F): 98.4 (25 Apr 2024 08:00), Max: 98.8 (24 Apr 2024 16:00)  HR: 95 (25 Apr 2024 11:00) (80 - 107)  BP: 110/51 (25 Apr 2024 11:00) (90/36 - 133/55)  BP(mean): 67 (25 Apr 2024 11:00) (51 - 78)  RR: 20 (25 Apr 2024 11:00) (14 - 27)  SpO2: 96% (25 Apr 2024 11:00) (92% - 100%)    Parameters below as of 25 Apr 2024 11:00  Patient On (Oxygen Delivery Method): nasal cannula w/ humidification  O2 Flow (L/min): 2      I/Os:   I&O's Detail    24 Apr 2024 07:01  -  25 Apr 2024 07:00  --------------------------------------------------------  IN:    IV PiggyBack: 52 mL    IV PiggyBack: 200 mL    Lactated Ringers: 720 mL    Phenylephrine: 101.5 mL  Total IN: 1073.5 mL    OUT:    Chest Tube (mL): 120 mL    Chest Tube (mL): 190 mL    Indwelling Catheter - Urethral (mL): 1085 mL  Total OUT: 1395 mL    Total NET: -321.5 mL      25 Apr 2024 07:01  -  25 Apr 2024 11:43  --------------------------------------------------------  IN:    Lactated Ringers: 120 mL  Total IN: 120 mL    OUT:    Chest Tube (mL): 20 mL    Chest Tube (mL): 10 mL    Indwelling Catheter - Urethral (mL): 50 mL  Total OUT: 80 mL    Total NET: 40 mL          CAPILLARY BLOOD GLUCOSE          =======================MEDICATIONS===================  MEDICATIONS  (STANDING):  acetaminophen     Tablet .. 650 milliGRAM(s) Oral every 6 hours  albuterol    0.083% 2.5 milliGRAM(s) Nebulizer every 6 hours  heparin   Injectable 5000 Unit(s) SubCutaneous every 8 hours  lactated ringers. 1000 milliLiter(s) (30 mL/Hr) IV Continuous <Continuous>  lidocaine   4% Patch 1 Patch Transdermal daily  phenylephrine    Infusion 0.5 MICROgram(s)/kG/Min (14.5 mL/Hr) IV Continuous <Continuous>  polyethylene glycol 3350 17 Gram(s) Oral daily  ropivacaine 0.2% in Sodium Chloride PCEA 200 milliLiter(s) Epidural PCA Continuous  senna 2 Tablet(s) Oral at bedtime    MEDICATIONS  (PRN):  cyclobenzaprine 2.5 milliGRAM(s) Oral every 8 hours PRN Muscle Spasm  HYDROmorphone  Injectable 0.2 milliGRAM(s) IV Push every 3 hours PRN Severe breakthrough Pain (7 - 10)  naloxone Injectable 0.1 milliGRAM(s) IV Push every 3 minutes PRN For ANY of the following changes in patient status:  A. RR LESS THAN 10 breaths per minute, B. Oxygen saturation LESS THAN 90%, C. Sedation score of 6  ondansetron Injectable 4 milliGRAM(s) IV Push every 6 hours PRN Nausea      PHYSICAL EXAM============================  General:                         Awake, alert, not in any distress  Neuro:                            Moving all extremities to commands. C collar in place   Respiratory:	Air entry fair and  bilateral conducted sounds                                           Effort even and unlabored.  CV:		Regular rate and rhythm. Normal S1/S2                                          Distal pulses present.  Abdomen:	                     Soft, non-distended. Bowel sounds present   Skin:		No rash.  Extremities:	Warm, no cyanosis or edema.  Palpable pulses    ============================LABS=========================                        9.6    13.38 )-----------( 309      ( 25 Apr 2024 02:50 )             30.0     04-25    139  |  103  |  20  ----------------------------<  148<H>  4.4   |  26  |  0.88    Ca    8.6      25 Apr 2024 02:50  Phos  3.1     04-25  Mg     2.10     04-25    TPro  6.7  /  Alb  3.6  /  TBili  0.3  /  DBili  x   /  AST  16  /  ALT  10  /  AlkPhos  83  04-23    LIVER FUNCTIONS - ( 23 Apr 2024 20:35 )  Alb: 3.6 g/dL / Pro: 6.7 g/dL / ALK PHOS: 83 U/L / ALT: 10 U/L / AST: 16 U/L / GGT: x           PT/INR - ( 24 Apr 2024 03:23 )   PT: 11.2 sec;   INR: 0.99 ratio         PTT - ( 24 Apr 2024 03:23 )  PTT:30.4 sec  ABG - ( 23 Apr 2024 15:45 )  pH, Arterial: 7.37  pH, Blood: x     /  pCO2: 48    /  pO2: 231   / HCO3: 28    / Base Excess: 1.9   /  SaO2: 99.3              Urinalysis Basic - ( 25 Apr 2024 02:50 )    Color: x / Appearance: x / SG: x / pH: x  Gluc: 148 mg/dL / Ketone: x  / Bili: x / Urobili: x   Blood: x / Protein: x / Nitrite: x   Leuk Esterase: x / RBC: x / WBC x   Sq Epi: x / Non Sq Epi: x / Bacteria: x    A/P:  68yFemale s/p Left Thoracotomy w/ resection of pancoast tumor and SAMRA wedge, resection of chest wall(L 1-3rd tribs) w/ Reconstruction w. gortex , experiencing  pain with deep breathing.                             Neuro:                                         Pain control with  PCEA /  Tylenol PRN / Lidopatch    Spoke to Neurosurgery - OK with CT cervical spine  in AM   cervical collar in place, pending C spine CT, nonfocal on exam                             Cardiovascular:                                          Telemetry (medical test) - Reviewed by me today independently. Pt is in normal sinus rhythm .                                         Continue hemodynamic monitoring to prevent decompensation.                            Respiratory:                                         Postop hypoxemia requiring O2 via nasal cannula probably due to postop pain - Wean nasal cannula for goal O2sat above 92%.                                            Encourage incentive spirometry.                                                   Chest PT and frequent suctioning.                                          Continue bronchodilators, inhaled steroids, Pulmozyme and inhaled 3% saline inhalations.                                         OOB to chair & ambulate w/ assistance.                                          Continuous pulse oximetry for support & to prevent decompensation.                                         Monitor chest tube output                                         Chest tube to suction                                                                                     GI                                         On regular diet as tolerated                                         Continue G.I prophylaxis with Protonix                                          Continue Zofran / Reglan for nausea - PRN                                         Continue bowel regimen	                                                                 Renal:                                                                                Monitor I/Os and electrolytes                                                                                        Hem/ Onc:                                         DVT prophylaxis with SQ Heparin and SCDs                                         Monitor chest tube output &  signs of bleeding.                                          Follow CBC in AM                           Infectious disease:                                            Monitor for fever / leukocytosis.                                          All surgical incision / chest tube  sites look clean                            Endocrine                                             Continue Accu-Checks with coverage                                                Pertinent clinical, laboratory, radiographic, hemodynamic, echocardiographic, respiratory data, microbiologic data and chart were reviewed and analyzed frequently throughout the course of the day and night. Patient seen, examined and plan discussed with CT Surgeon Dr. Guan  / CTICU team during rounds.  Status discussed with patient and updated plan of care.   I have spent 50 minutes with this patient  including 20 minutes of time monitoring hemodynamic status, respiratory status and  coordinating care in the ICU.      Keturah DARNELLP    
GODFREY ALVAREZ      68y   Female   MRN-[id number]         No Known Allergies             Daily     Daily Weight in k.1 (2024 00:00)Drug Dosing Weight  Height (cm): 160 (2024 10:47)  Weight (kg): 76.4 (2024 00:00)  BMI (kg/m2): 29.8 (2024 00:00)  BSA (m2): 1.8 (2024 00:00)    68 year old female with pmhx of COPD, Former smoker (1PPD x 50 years; Quit 2024), 2023 CT Chest revealing a SAMRA cavitary mass with adjacent rib destruction ans well as a 4 mm RLL nodule. 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 PET/CT (2023) showed SAMRA mass. Pt received 3 cycles of neoadjuvant treatment with cisplatin, paclitaxel and nivolumab. Followed by Oncologist Dr. Vineet Melendez.  Patient presents for pre-op evaluations for diagnosis of Other nonspecific abnormal finding of lung field. Patient is scheduled for Bronchoscopy, Mediastinoscopy, left thoracotomy, resection of pancoast tumor with left upper lobe lobectomy and chest wall reconstruction with epidural.   Denies cp, palpitations, dizziness, fevers, n/v/d/c. Patient has post nasal drip cough due to Seasonal allergies.   Patient had a fall 6 weeks ago and sustained a cervical fracture. Follows with Ortho Spine specialist in [facility] in [place], Dr Barger.  (2024 12:08)      CHIEF COMPLAINT: Follow up in ICU  for postoperative care of patient who is s/p lung surgery    Procedure:  Left Thoracotomy w/ resection of pancoast tumor and SAMRA wedge, resection of chest wall(L 1-3rd tribs) w/ Reconstruction w. gortex                       Issues:              Squamous cell carcinoma of Lung              Postop pain              Chest tube in place  H/O fracture of ankle  History of COPD  Status post ORIF of fracture of ankle  Cervical vertebral fracture    Postop course:     Patient reports moderate pain at chest wall incision sites which is worse with coughing and deep breathing without associated fever or dyspnea. Pain is improved with use of PCA and  oral pain meds.         Home Medications:  Tylenol 500 mg oral tablet: 2 tab(s) orally as needed (2024 11:01)    PAST MEDICAL & SURGICAL HISTORY:  Seasonal allergies      H/O fracture of ankle      History of COPD      Other nonspecific abnormal finding of lung field      Status post ORIF of fracture of ankle        Vital Signs Last 24 Hrs  T(C): 36.9 (2024 08:00), Max: 37.6 (2024 00:00)  T(F): 98.5 (2024 08:00), Max: 99.6 (2024 00:00)  HR: 90 (2024 08:00) (86 - 109)  BP: 118/53 (2024 08:00) (102/75 - 143/60)  BP(mean): 71 (2024 08:00) (63 - 85)  RR: 26 (2024 08:00) (16 - 27)  SpO2: 95% (2024 08:00) (93% - 100%)    Parameters below as of 2024 08:00  Patient On (Oxygen Delivery Method): nasal cannula w/ humidification  O2 Flow (L/min): 2    I&O's Detail    2024 07:01  -  2024 07:00  --------------------------------------------------------  IN:    Lactated Ringers: 720 mL  Total IN: 720 mL    OUT:    Chest Tube (mL): 170 mL    Chest Tube (mL): 50 mL    Indwelling Catheter - Urethral (mL): 50 mL    Voided (mL): 1100 mL  Total OUT: 1370 mL    Total NET: -650 mL        CAPILLARY BLOOD GLUCOSE        Home Medications:  Tylenol 500 mg oral tablet: 2 tab(s) orally as needed (2024 11:01)    MEDICATIONS  (STANDING):  acetaminophen     Tablet .. 650 milliGRAM(s) Oral every 6 hours  albuterol    0.083% 2.5 milliGRAM(s) Nebulizer every 6 hours  dornase asa Solution 2.5 milliGRAM(s) Inhalation daily  heparin   Injectable 5000 Unit(s) SubCutaneous every 8 hours  lactated ringers. 1000 milliLiter(s) (30 mL/Hr) IV Continuous <Continuous>  lidocaine   4% Patch 1 Patch Transdermal daily  phenylephrine    Infusion 0.5 MICROgram(s)/kG/Min (14.5 mL/Hr) IV Continuous <Continuous>  polyethylene glycol 3350 17 Gram(s) Oral daily  ropivacaine 0.2% in Sodium Chloride PCEA 200 milliLiter(s) Epidural PCA Continuous  senna 2 Tablet(s) Oral at bedtime  sodium chloride 3%  Inhalation 4 milliLiter(s) Inhalation every 12 hours    MEDICATIONS  (PRN):  cyclobenzaprine 2.5 milliGRAM(s) Oral every 8 hours PRN Muscle Spasm  HYDROmorphone  Injectable 0.2 milliGRAM(s) IV Push every 3 hours PRN Severe breakthrough Pain (7 - 10)  naloxone Injectable 0.1 milliGRAM(s) IV Push every 3 minutes PRN For ANY of the following changes in patient status:  A. RR LESS THAN 10 breaths per minute, B. Oxygen saturation LESS THAN 90%, C. Sedation score of 6  ondansetron Injectable 4 milliGRAM(s) IV Push every 6 hours PRN Nausea        Physical exam:     General:               Pt is awake, alert,  appears to be in pain but not in distress                                                  Neuro:                  Nonfocal                             Psych:                   A&Ox3                          Cardiovascular:   S1 & S2, regular                           Respiratory:         Air entry is fair and equal on both sides, has bilateral conducted sounds                           GI:                          Soft, nondistended and nontender, Bowel sounds active                            Ext:                        No cyanosis or edema                             Labs:                                                                           8.7    12.64 )-----------( 298      ( 2024 03:56 )             27.6             04-26    140  |  104  |  15  ----------------------------<  123<H>  3.9   |  27  |  0.72    Ca    8.5      2024 03:56  Phos  2.4     04-26  Mg     2.00     04-26                    PT/INR - ( 2024 06:00 )   PT: 10.6 sec;   INR: 0.94 ratio         PTT - ( 2024 06:00 )  PTT:29.8 sec    Urinalysis Basic - ( 2024 03:56 )    Color: x / Appearance: x / SG: x / pH: x  Gluc: 123 mg/dL / Ketone: x  / Bili: x / Urobili: x   Blood: x / Protein: x / Nitrite: x   Leuk Esterase: x / RBC: x / WBC x   Sq Epi: x / Non Sq Epi: x / Bacteria: x        CXR:    < from: Xray Chest 1 View AP/PA (24 @ 06:37) >  Status post thoracotomy and tumor resection. 2 left chest tubes in the   upper lung at the site of tumor resection. Likely atelectasis and edema   in the surrounding left upper lobe. Small left apical pneumothorax.    < from: CT Cervical Spine No Cont (24 @ 15:08) >  1.  Subacute fracture of the right lateral mass of C6, with mild C6-7   anterolisthesis.  2.  Subacute compression deformities from C7-T3.  3.  Postsurgical changes at the left lung apex.      Plan:  General: 68yFemale s/p Left Thoracotomy w/ resection of pancoast tumor and SAMRA wedge, resection of chest wall(L 1-3rd tribs) w/ Reconstruction w. gortex , experiencing  pain with deep breathing.                             Neuro:                                         Pain control with  PCEA /  Tylenol PRN / Lidopatch    Cervical fracture - Hard neck collar.     NS f/u                            Cardiovascular:                                          Telemetry (medical test) - Reviewed by me today independently. Pt is in normal sinus rhythm .                                         Continue hemodynamic monitoring to prevent decompensation.                            Respiratory:                                         Postop hypoxemia requiring O2 via nasal cannula probably due to postop pain - Wean nasal cannula for goal O2sat above 92%.                                              Encourage incentive spirometry.                                                   Chest PT and frequent suctioning.                                          Continue bronchodilators, inhaled steroids, Pulmozyme and inhaled 3% saline inhalations.                                         OOB to chair & ambulate w/ assistance.                                          Continuous pulse oximetry for support & to prevent decompensation.                                         Monitor chest tube output                                         Chest tube to WS,                                                                                      GI                                         On   regular diet as tolerated                                         Continue G.I prophylaxis with Protonix                                          Continue Zofran / Reglan for nausea - PRN                                         Continue bowel regimen	                                                                 Renal:                                                                                  Monitor I/Os and electrolytes                                                                                        Hem/ Onc:                                         DVT prophylaxis with SQ Heparin and SCDs                                         Monitor chest tube output &  signs of bleeding.                                          Follow CBC in AM                           Infectious disease:                                            Monitor for fever / leukocytosis.                                          All surgical incision / chest tube  sites look clean                            Endocrine                                             Continue Accu-Checks with coverage                                                Pertinent clinical, laboratory, radiographic, hemodynamic, echocardiographic, respiratory data, microbiologic data and chart were reviewed and analyzed frequently throughout the course of the day and night. Patient seen, examined and plan discussed with CT Surgeon Dr. Vivas   / CTICU team during rounds.  Status discussed with patient and updated plan of care.  I have spent 50 minutes with this patient  including 20 minutes of time monitoring hemodynamic status, respiratory status and  coordinating care in the ICU.          Juan Luis Clay MD                                                                    
   Subjective: "I feel Ok, my pain is better" Pt c/o difficulty with neck collar at times. Denies numbness, tingling or change in motor strength. OOB w assist.   No CP or SOB.     Vital Signs:  Vital Signs Last 24 Hrs  T(C): 36.6 (04-29-24 @ 11:25), Max: 36.8 (04-28-24 @ 16:38)  T(F): 97.9 (04-29-24 @ 11:25), Max: 98.3 (04-28-24 @ 16:38)  HR: 99 (04-29-24 @ 11:25) (72 - 103)  BP: 132/54 (04-29-24 @ 11:25) (128/54 - 157/51)  RR: 18 (04-29-24 @ 11:25) (17 - 18)  SpO2: 98% (04-29-24 @ 11:25) (95% - 99%) on (O2)    Telemetry/Alarms: SR  Relevant labs, radiology and Medications reviewed           CXR stable.              9.2    9.08  )-----------( 406      ( 28 Apr 2024 05:38 )             29.1     04-28    140  |  101  |  20  ----------------------------<  105<H>  4.2   |  26  |  0.57    Ca    8.8      28 Apr 2024 05:38  Phos  3.7     04-28  Mg     2.00     04-28      PT/INR - ( 28 Apr 2024 05:38 )   PT: 10.7 sec;   INR: 0.95 ratio         PTT - ( 28 Apr 2024 05:38 )  PTT:30.3 sec  MEDICATIONS  (STANDING):  acetaminophen     Tablet .. 650 milliGRAM(s) Oral every 6 hours  albuterol    0.083% 2.5 milliGRAM(s) Nebulizer every 6 hours  dornase asa Solution 2.5 milliGRAM(s) Inhalation daily  heparin   Injectable 5000 Unit(s) SubCutaneous every 8 hours  polyethylene glycol 3350 17 Gram(s) Oral daily  senna 2 Tablet(s) Oral at bedtime  sodium chloride 3%  Inhalation 4 milliLiter(s) Inhalation every 6 hours    MEDICATIONS  (PRN):  cyclobenzaprine 2.5 milliGRAM(s) Oral every 8 hours PRN Muscle Spasm  HYDROmorphone   Tablet 2 milliGRAM(s) Oral every 3 hours PRN Moderate to Severe Pain (4 - 10)  naloxone Injectable 0.1 milliGRAM(s) IV Push every 3 minutes PRN For ANY of the following changes in patient status:  A. RR LESS THAN 10 breaths per minute, B. Oxygen saturation LESS THAN 90%, C. Sedation score of 6  ondansetron Injectable 4 milliGRAM(s) IV Push every 6 hours PRN Nausea  traMADol 25 milliGRAM(s) Oral every 8 hours PRN Severe Pain (7 - 10)    General: WD NAD  Neurology: A&Ox3, nonfocal, TRAN x 4  Eyes: PERRLA/ EOMI, Gross vision intact  ENT/Neck: Neck supple, trachea midline, No JVD, Gross hearing intact  Respiratory: CTA B/L, No wheezing, rales, rhonchi. Dec'd BS to left side  CV: RRR, S1S2, no murmurs, rubs or gallops  Abdominal: Soft, NT, ND +BS, no BM, voiding  Extremities: No edema, + peripheral pulses  Skin: No Rashes, Hematoma, Ecchymosis  Incisions: left Thorcotomy c/d/i.   Tubes: none  I&O's Summary    28 Apr 2024 07:01  -  29 Apr 2024 07:00  --------------------------------------------------------  IN: 1015 mL / OUT: 275 mL / NET: 740 mL    29 Apr 2024 07:01  -  29 Apr 2024 14:14  --------------------------------------------------------  IN: 360 mL / OUT: 300 mL / NET: 60 mL      Assessment  68y Female  w/ PAST MEDICAL & SURGICAL HISTORY:  Seasonal allergies      H/O fracture of ankle      History of COPD      Other nonspecific abnormal finding of lung field      Status post ORIF of fracture of ankle  ASSESSMENT  68 year old female  underwent FB, L THoracotomy, Resection of Pancoast tumor, and SAMRA wedge & L 1 to 3 rib resection, chest wall reconstruction, and MLND on 4/23/24.  Neurosurgery  had been following and a CT scan showed a C6 fx that required a [place] J collar while OOB.  The Chest tube was removed on 4/26. Discharge plannning, plan for rehab this week,      PLAN  Neuro: Pain management. Cont current meds  Pulm: Encourage coughing, deep breathing and use of incentive spirometry. Wean off supplemental oxygen as able. Daily CXR.   Cardio: Monitor telemetry/alarms  GI: Tolerating diet. Continue stool softeners.  Renal: monitor urine output, supplement electrolytes as needed  Vasc: Heparin SC/SCDs for DVT prophylaxis  Heme: Stable H/H. .   ID: Off antibiotics. Stable.  Therapy: OOB/ambulate. PT recc'd rehab  Disposition: Aim to D/C to Rehab today if accepted. COVID swab sent and negative.   Discussed with Cardiothoracic Team at AM rounds.  
   Subjective: pt seen and examined w Dr Vivas  still requiring oxygen  OOB to chair  ambulating with assistance  agreeable to rehab upon discharge   above d/w case management who will notify social work for rehab planning      Vital Signs:  Vital Signs Last 24 Hrs  T(C): 36.4 (04-28-24 @ 12:24), Max: 37.1 (04-28-24 @ 00:56)  T(F): 97.5 (04-28-24 @ 12:24), Max: 98.7 (04-28-24 @ 00:56)  HR: 84 (04-28-24 @ 12:24) (76 - 106)  BP: 142/69 (04-28-24 @ 12:24) (132/69 - 151/63)  RR: 18 (04-28-24 @ 12:24) (17 - 18)  SpO2: 98% (04-28-24 @ 12:24) (95% - 100%) on (O2)      PE  Telemetry/Alarms: SR  General: awake and alert NAD  Neurology: A&Ox3, nonfocal, TRAN x 4  Respiratory: CTA B/L  CV: RRR, S1S2, no murmurs, rubs or gallops  Abdominal: Soft, NT, ND +BS, Last BM  Extremities: No edema, + peripheral pulses  Incisions: c,d,i sutures intact  Relevant labs, radiology and Medications reviewed                        9.2    9.08  )-----------( 406      ( 28 Apr 2024 05:38 )             29.1     04-28    140  |  101  |  20  ----------------------------<  105<H>  4.2   |  26  |  0.57    Ca    8.8      28 Apr 2024 05:38  Phos  3.7     04-28  Mg     2.00     04-28      PT/INR - ( 28 Apr 2024 05:38 )   PT: 10.7 sec;   INR: 0.95 ratio         PTT - ( 28 Apr 2024 05:38 )  PTT:30.3 sec  MEDICATIONS  (STANDING):  acetaminophen     Tablet .. 650 milliGRAM(s) Oral every 6 hours  albuterol    0.083% 2.5 milliGRAM(s) Nebulizer every 6 hours  dornase asa Solution 2.5 milliGRAM(s) Inhalation daily  heparin   Injectable 5000 Unit(s) SubCutaneous every 8 hours  polyethylene glycol 3350 17 Gram(s) Oral daily  senna 2 Tablet(s) Oral at bedtime  sodium chloride 3%  Inhalation 4 milliLiter(s) Inhalation every 6 hours    MEDICATIONS  (PRN):  cyclobenzaprine 2.5 milliGRAM(s) Oral every 8 hours PRN Muscle Spasm  HYDROmorphone   Tablet 2 milliGRAM(s) Oral every 3 hours PRN Moderate to Severe Pain (4 - 10)  naloxone Injectable 0.1 milliGRAM(s) IV Push every 3 minutes PRN For ANY of the following changes in patient status:  A. RR LESS THAN 10 breaths per minute, B. Oxygen saturation LESS THAN 90%, C. Sedation score of 6  ondansetron Injectable 4 milliGRAM(s) IV Push every 6 hours PRN Nausea  traMADol 25 milliGRAM(s) Oral every 8 hours PRN Severe Pain (7 - 10)    Pertinent Physical Exam  I&O's Summary    27 Apr 2024 07:01  -  28 Apr 2024 07:00  --------------------------------------------------------  IN: 830 mL / OUT: 600 mL / NET: 230 mL    28 Apr 2024 07:01  -  28 Apr 2024 14:07  --------------------------------------------------------  IN: 440 mL / OUT: 0 mL / NET: 440 mL          PAST MEDICAL & SURGICAL HISTORY:  Seasonal allergies      H/O fracture of ankle      History of COPD      Other nonspecific abnormal finding of lung field      Status post ORIF of fracture of ankle        ASSESSMENT  68 year old female  underwent FB, L THoracotomy, Resection of Pancoast tumor, and SAMRA wedge & L 1 to 3 rib resection, chest wall reconstruction, and MLND on 4/23/24.  Neurosurgery  had been following and a CT scan showed a C6 fx that required a [place] J collar while OOB.  The Chest tube was removed on 4/26. Discharge plannning, plan for rehab this week,    PLAN  Neuro: Pain management  Pulm: Encourage coughing, deep breathing and use of incentive spirometry. Pt requiring supplemental oxygen. Daily CXR.   Cardio: Monitor telemetry/alarms  GI: Tolerating diet. Continue stool softeners.  Renal: monitor urine output, supplement electrolytes as needed  Vasc: Heparin SC/SCDs for DVT prophylaxis  Heme: Stable H/H. .   ID: Off antibiotics. Stable.  Therapy: OOB/ambulate  Disposition: discharge planning, discharge to rehab this week  Discussed with Cardiothoracic Team at AM rounds.  
ANESTHESIA POSTOP CHECK    68y Female POSTOP DAY 1 S/P   [ x] General Anesthesia  [ x] Gerber Anesthesia  [ ] MAC    Vital Signs Last 24 Hrs  T(C): 36.7 (24 Apr 2024 12:00), Max: 36.9 (24 Apr 2024 04:00)  T(F): 98.1 (24 Apr 2024 12:00), Max: 98.4 (24 Apr 2024 04:00)  HR: 103 (24 Apr 2024 15:36) (32 - 104)  BP: 105/66 (24 Apr 2024 14:00) (66/43 - 118/49)  BP(mean): 67 (24 Apr 2024 14:00) (50 - 95)  RR: 17 (24 Apr 2024 14:00) (12 - 25)  SpO2: 96% (24 Apr 2024 15:36) (89% - 99%)    Parameters below as of 24 Apr 2024 15:36  Patient On (Oxygen Delivery Method): nasal cannula w/ humidification      I&O's Summary    23 Apr 2024 07:01  -  24 Apr 2024 07:00  --------------------------------------------------------  IN: 715.8 mL / OUT: 625 mL / NET: 90.8 mL    24 Apr 2024 07:01  -  24 Apr 2024 15:45  --------------------------------------------------------  IN: 247.7 mL / OUT: 345 mL / NET: -97.3 mL        [x ] NO APPARENT ANESTHESIA COMPLICATIONS      Comments: 
Anesthesia Pain Management Service    SUBJECTIVE: Pt doing well with PCEA without problems reported.    Therapy:	  [ ] IV PCA	   [ X] Epidural           [ ] s/p Spinal Opoid              [ ] Postpartum infusion	  [ ] Patient controlled regional anesthesia (PCRA)    [ ] prn Analgesics    Allergies    No Known Allergies    Intolerances      MEDICATIONS  (STANDING):  acetaminophen     Tablet .. 650 milliGRAM(s) Oral every 6 hours  albuterol    0.083% 2.5 milliGRAM(s) Nebulizer every 6 hours  heparin   Injectable 5000 Unit(s) SubCutaneous every 8 hours  lactated ringers. 1000 milliLiter(s) (30 mL/Hr) IV Continuous <Continuous>  lidocaine   4% Patch 1 Patch Transdermal daily  phenylephrine    Infusion 0.5 MICROgram(s)/kG/Min (14.5 mL/Hr) IV Continuous <Continuous>  polyethylene glycol 3350 17 Gram(s) Oral daily  ropivacaine 0.2% in Sodium Chloride PCEA 200 milliLiter(s) Epidural PCA Continuous  senna 2 Tablet(s) Oral at bedtime    MEDICATIONS  (PRN):  cyclobenzaprine 2.5 milliGRAM(s) Oral every 8 hours PRN Muscle Spasm  HYDROmorphone  Injectable 0.2 milliGRAM(s) IV Push every 3 hours PRN Severe breakthrough Pain (7 - 10)  naloxone Injectable 0.1 milliGRAM(s) IV Push every 3 minutes PRN For ANY of the following changes in patient status:  A. RR LESS THAN 10 breaths per minute, B. Oxygen saturation LESS THAN 90%, C. Sedation score of 6  ondansetron Injectable 4 milliGRAM(s) IV Push every 6 hours PRN Nausea      OBJECTIVE:   [X] No new signs     [ ] Other:    Side Effects:  [X ] None			[ ] Other:    Assessment of Catheter Site:		[ X] Intact		[ ] Other:    ASSESSMENT/PLAN  [ X] Continue current therapy    [ ] Therapy changed to:    [ ] IV PCA       [ ] Epidural     [ ] prn Analgesics     Comments: Continue current PCEA settings.
Anesthesia Pain Management Service: Post op  Day 3_ of Epidural    SUBJECTIVE: Patient doing well with PCEA and no problems. Denies headache, numbness and tingling. Reports feeling" out of it" with Oxycodone in the past. Patient states she did well on the IV Dilaudid PRN.  Pain Scale Score: 6/10 THERAPY:  [x ] Epidural Bupivacaine 0.0625% and Hydromorphone  		[X ] 10 micrograms/mL	[ ] 5 micrograms/mL  [ ] Epidural Bupivacaine 0.0625% and Fentanyl - 2 micrograms/mL  [ ] Epidural Ropivacaine 0.1% plain – 1 mg/mL  [ ] Patient Controlled Regional Anesthesia (PCRA) Ropivacaine  		[ ] 0.2%			[ ] 0.1%    Demand dose __3_ lockout __15_ (minutes) Continuous Rate 8___ Total: __213.15_ Daily      MEDICATIONS  (STANDING):  acetaminophen     Tablet .. 650 milliGRAM(s) Oral every 6 hours  albuterol    0.083% 2.5 milliGRAM(s) Nebulizer every 6 hours  dornase asa Solution 2.5 milliGRAM(s) Inhalation daily  heparin   Injectable 5000 Unit(s) SubCutaneous every 8 hours  lidocaine   4% Patch 1 Patch Transdermal daily  polyethylene glycol 3350 17 Gram(s) Oral daily  senna 2 Tablet(s) Oral at bedtime  sodium chloride 3%  Inhalation 4 milliLiter(s) Inhalation every 6 hours    MEDICATIONS  (PRN):  cyclobenzaprine 2.5 milliGRAM(s) Oral every 8 hours PRN Muscle Spasm  HYDROmorphone   Tablet 2 milliGRAM(s) Oral every 3 hours PRN Moderate to Severe Pain (4 - 10)  naloxone Injectable 0.1 milliGRAM(s) IV Push every 3 minutes PRN For ANY of the following changes in patient status:  A. RR LESS THAN 10 breaths per minute, B. Oxygen saturation LESS THAN 90%, C. Sedation score of 6  ondansetron Injectable 4 milliGRAM(s) IV Push every 6 hours PRN Nausea  traMADol 25 milliGRAM(s) Oral every 8 hours PRN Severe Pain (7 - 10)      OBJECTIVE: Patient sitting up in chair.    Assessment of Catheter Site:	[ ] Left	[ ] Right  [x ] Epidural 	[ ] Femoral	      [ ] Saphenous   [ ] Supraclavicular   [ ] Other:    [x ] Dressing intact	[x ] Site non-tender	[ x] Site without erythema, discharge, edema  [x ] Epidural tubing and connection checked	[x] Gross neurological exam within normal limits  [X ] Catheter removed – tip intact		[ ] Afebrile	  [ ] Febrile: ___       [ X] see Temp under VS below)    PT/INR - ( 27 Apr 2024 05:00 )   PT: 10.9 sec;   INR: 0.97 ratio         PTT - ( 27 Apr 2024 05:00 )  PTT:32.4 sec                      8.8    9.96  )-----------( 319      ( 27 Apr 2024 05:00 )             27.3     Vital Signs Last 24 Hrs  T(C): 37.1 (04-27-24 @ 08:00), Max: 37.2 (04-26-24 @ 20:00)  T(F): 98.7 (04-27-24 @ 08:00), Max: 99 (04-26-24 @ 20:00)  HR: 95 (04-27-24 @ 11:04) (79 - 102)  BP: 123/72 (04-27-24 @ 08:00) (110/50 - 145/60)  BP(mean): 85 (04-27-24 @ 08:00) (67 - 106)  RR: 25 (04-27-24 @ 11:04) (15 - 28)  SpO2: 96% (04-27-24 @ 11:04) (92% - 100%)      Sedation Score:	[x ] Alert	[ ] Drowsy	[ ] Arousable	[ ] Asleep	[ ] Unresponsive    Side Effects:	[x ] None	[ ] Nausea	[ ] Vomiting	[ ] Pruritus  		[ ] Weakness		[ ] Numbness	[ ] Other:    ASSESSMENT/ PLAN:    Therapy to  be:	[ ] Continue   [ X] Discontinued   [ X] Change to prn Analgesics    Documentation and Verification of current medications:  [ X ] Done	[ ] Not done, not eligible, reason:    Comments: PCEA discontinued. Changed to IV/oral opioid and/or non-opioid Adjuvant analgesics to be used at this point.    Progress Note written now but Patient was seen earlier.
GODFREY ALVAREZ      68y   Female   [id number]         No Known Allergies             Daily     Daily Drug Dosing Weight  Height (cm): 160 (23 Apr 2024 10:47)  Weight (kg): 77.065 (23 Apr 2024 10:47)  BMI (kg/m2): 30.1 (23 Apr 2024 10:47)  BSA (m2): 1.8 (23 Apr 2024 10:47)    HPI:  68 year old female with pmhx of COPD, Former smoker (1PPD x 50 years; Quit Jan 2024), November 2023 CT Chest revealing a SAMRA cavitary mass with adjacent rib destruction ans well as a 4 mm RLL nodule. 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 PET/CT (12/2023) showed SAMRA mass. Pt received 3 cycles of neoadjuvant treatment with cisplatin, paclitaxel and nivolumab. Followed by Oncologist Dr. Vineet Melendez.  Patient presents for pre-op evaluations for diagnosis of Other nonspecific abnormal finding of lung field. Patient is scheduled for Bronchoscopy, Mediastinoscopy, left thoracotomy, resection of pancoast tumor with left upper lobe lobectomy and chest wall reconstruction with epidural.   Denies cp, palpitations, dizziness, fevers, n/v/d/c. Patient has post nasal drip cough due to Seasonal allergies.   Patient had a fall 6 weeks ago and sustained a cervical fracture. Follows with Ortho Spine specialist in [facility] in [place], Dr Barger.  (17 Apr 2024 12:08)      CHIEF COMPLAINT: Follow up in ICU  for postoperative care of patient who is s/p lung surgery    Procedure:  Left Thoracotomy w/ resection of pancoast tumor and SAMRA wedge, resection of chest wall(L 1-3rd tribs) w/ Reconstruction w. gortex                       Issues:              Squamous cell carcinoma of Lung              Postop pain              Chest tube in place  H/O fracture of ankle  History of COPD  Status post ORIF of fracture of ankle      Postop course:     Patient reports moderate pain at chest wall incision sites which is worse with coughing and deep breathing without associated fever or dyspnea. Pain is improved with use of PCA and  oral pain meds.         Home Medications:  Tylenol 500 mg oral tablet: 2 tab(s) orally as needed (23 Apr 2024 11:01)    PAST MEDICAL & SURGICAL HISTORY:  Seasonal allergies      H/O fracture of ankle      History of COPD      Other nonspecific abnormal finding of lung field      Status post ORIF of fracture of ankle        Vital Signs Last 24 Hrs  T(C): 36.7 (24 Apr 2024 08:00), Max: 36.9 (24 Apr 2024 04:00)  T(F): 98 (24 Apr 2024 08:00), Max: 98.4 (24 Apr 2024 04:00)  HR: 84 (24 Apr 2024 10:00) (32 - 97)  BP: 111/41 (24 Apr 2024 10:00) (66/43 - 113/55)  BP(mean): 61 (24 Apr 2024 10:00) (50 - 95)  RR: 14 (24 Apr 2024 10:00) (12 - 25)  SpO2: 97% (24 Apr 2024 10:00) (89% - 99%)    Parameters below as of 24 Apr 2024 10:00  Patient On (Oxygen Delivery Method): nasal cannula w/ humidification  O2 Flow (L/min): 4    I&O's Detail    23 Apr 2024 07:01  -  24 Apr 2024 07:00  --------------------------------------------------------  IN:    Albumin 5%  - 250 mL: 250 mL    IV PiggyBack: 100 mL    Lactated Ringers: 360 mL    Phenylephrine: 5.8 mL  Total IN: 715.8 mL    OUT:    Chest Tube (mL): 75 mL    Chest Tube (mL): 200 mL    Indwelling Catheter - Urethral (mL): 350 mL  Total OUT: 625 mL    Total NET: 90.8 mL      24 Apr 2024 07:01  -  24 Apr 2024 11:06  --------------------------------------------------------  IN:    Lactated Ringers: 120 mL    Phenylephrine: 17.4 mL  Total IN: 137.4 mL    OUT:    Chest Tube (mL): 30 mL    Chest Tube (mL): 10 mL    Indwelling Catheter - Urethral (mL): 130 mL  Total OUT: 170 mL    Total NET: -32.6 mL        CAPILLARY BLOOD GLUCOSE        Home Medications:  Tylenol 500 mg oral tablet: 2 tab(s) orally as needed (23 Apr 2024 11:01)    MEDICATIONS  (STANDING):  albuterol    0.083% 2.5 milliGRAM(s) Nebulizer every 6 hours  heparin   Injectable 5000 Unit(s) SubCutaneous every 8 hours  hydromorphone (10 MICROgram(s)/mL) + bupivacaine 0.0625% in 0.9% Sodium Chloride PCEA 250 milliLiter(s) Epidural PCA Continuous  lactated ringers. 1000 milliLiter(s) (30 mL/Hr) IV Continuous <Continuous>  lidocaine   4% Patch 1 Patch Transdermal daily  phenylephrine    Infusion 0.5 MICROgram(s)/kG/Min (14.5 mL/Hr) IV Continuous <Continuous>  polyethylene glycol 3350 17 Gram(s) Oral daily  senna 2 Tablet(s) Oral at bedtime    MEDICATIONS  (PRN):  acetaminophen   IVPB .. 1000 milliGRAM(s) IV Intermittent once PRN Mild Pain (1 - 3)  hydromorphone (10 MICROgram(s)/mL) + bupivacaine 0.0625% in 0.9% Sodium Chloride PCEA Rescue Clinician  Bolus 5 milliLiter(s) Epidural every 15 minutes PRN for Pain Score greater than 6  naloxone Injectable 0.1 milliGRAM(s) IV Push every 3 minutes PRN For ANY of the following changes in patient status:  A. RR LESS THAN 10 breaths per minute, B. Oxygen saturation LESS THAN 90%, C. Sedation score of 6  ondansetron Injectable 4 milliGRAM(s) IV Push every 6 hours PRN Nausea        Physical exam:                             General:               Pt is awake, alert,  appears to be in pain but not in distress                                                  Neuro:                  Nonfocal                             Psych:                   A&Ox3                          Cardiovascular:   S1 & S2, regular                           Respiratory:         Air entry is fair and equal on both sides, has bilateral conducted sounds                           GI:                          Soft, nondistended and nontender, Bowel sounds active                            Ext:                        No cyanosis or edema     Labs:                                                                           9.2    16.21 )-----------( 297      ( 24 Apr 2024 03:00 )             28.9             04-24    140  |  105  |  17  ----------------------------<  141<H>  4.2   |  25  |  0.67    Ca    8.5      24 Apr 2024 03:00  Phos  4.9     04-24  Mg     2.00     04-24    TPro  6.7  /  Alb  3.6  /  TBili  0.3  /  DBili  x   /  AST  16  /  ALT  10  /  AlkPhos  83  04-23                  PT/INR - ( 24 Apr 2024 03:23 )   PT: 11.2 sec;   INR: 0.99 ratio         PTT - ( 24 Apr 2024 03:23 )  PTT:30.4 sec  LIVER FUNCTIONS - ( 23 Apr 2024 20:35 )  Alb: 3.6 g/dL / Pro: 6.7 g/dL / ALK PHOS: 83 U/L / ALT: 10 U/L / AST: 16 U/L / GGT: x           Urinalysis Basic - ( 24 Apr 2024 03:00 )    Color: x / Appearance: x / SG: x / pH: x  Gluc: 141 mg/dL / Ketone: x  / Bili: x / Urobili: x   Blood: x / Protein: x / Nitrite: x   Leuk Esterase: x / RBC: x / WBC x   Sq Epi: x / Non Sq Epi: x / Bacteria: x        CXR:      Plan:  General: 68yFemale s/p Left Thoracotomy w/ resection of pancoast tumor and SAMRA wedge, resection of chest wall(L 1-3rd tribs) w/ Reconstruction w. gortex , experiencing  pain with deep breathing.                             Neuro:                                         Pain control with  PCEA /  Tylenol PRN / Lidopatch    Spoke to Neurosurgery - OK with CT cervical spine  in AM  OK without cervical collar                            Cardiovascular:                                          Telemetry (medical test) - Reviewed by me today independently. Pt is in normal sinus rhythm .                                         Continue hemodynamic monitoring to prevent decompensation.                            Respiratory:                                         Postop hypoxemia requiring O2 via nasal cannula probably due to postop pain - Wean nasal cannula for goal O2sat above 92%.                                              CXR is clear. Encourage incentive spirometry.                                                   Chest PT and frequent suctioning.                                          Continue bronchodilators, inhaled steroids, Pulmozyme and inhaled 3% saline inhalations.                                         OOB to chair & ambulate w/ assistance.                                          Continuous pulse oximetry for support & to prevent decompensation.                                         Monitor chest tube output                                         Chest tube to suction                                                                                     GI                                         On puree diet, advance to  regular diet as tolerated                                         Continue G.I prophylaxis with Protonix                                          Continue Zofran / Reglan for nausea - PRN                                         Continue bowel regimen	                                                                 Renal:                                         Continue LR  30cc/hr                                         Monitor I/Os and electrolytes                                                                                        Hem/ Onc:                                         DVT prophylaxis with SQ Heparin and SCDs                                         Monitor chest tube output &  signs of bleeding.                                          Follow CBC in AM                           Infectious disease:                                            Monitor for fever / leukocytosis.                                          All surgical incision / chest tube  sites look clean                            Endocrine                                             Continue Accu-Checks with coverage                                                Pertinent clinical, laboratory, radiographic, hemodynamic, echocardiographic, respiratory data, microbiologic data and chart were reviewed and analyzed frequently throughout the course of the day and night. Patient seen, examined and plan discussed with CT Surgeon Dr. Guan  / CTICU team during rounds.  Status discussed with patient and updated plan of care.   I have spent 50 minutes with this patient  including 20 minutes of time monitoring hemodynamic status, respiratory status and  coordinating care in the ICU.        Juan Luis Clay MD                                                                    
MICHAELGODFREY HEALY                     [id number]    HPI:  68 year old female with pmhx of COPD, Former smoker (1PPD x 50 years; Quit Jan 2024), November 2023 CT Chest revealing a SAMRA cavitary mass with adjacent rib destruction ans well as a 4 mm RLL nodule. 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 PET/CT (12/2023) showed SAMRA mass. Pt received 3 cycles of neoadjuvant treatment with cisplatin, paclitaxel and nivolumab. Followed by Oncologist Dr. Vineet Melendez.  Patient presents for pre-op evaluations for diagnosis of Other nonspecific abnormal finding of lung field. Patient is scheduled for Bronchoscopy, Mediastinoscopy, left thoracotomy, resection of pancoast tumor with left upper lobe lobectomy and chest wall reconstruction with epidural.   Denies cp, palpitations, dizziness, fevers, n/v/d/c. Patient has post nasal drip cough due to Seasonal allergies.   Patient had a fall 6 weeks ago and sustained a cervical fracture. Follows with Ortho Spine specialist in [facility] in [place], Dr Barger.  (17 Apr 2024 12:08)      Procedure: Left Thoracotomy w/ resection of pancoast tumor and SAMRA wedge, resection of chest wall(L 1-3rd tribs) w/ Reconstruction w. gortex    Issues:  Pancoast tumor, left   COPD  Post op pain  Chest tube in place       PAST MEDICAL & SURGICAL HISTORY:  Seasonal allergies      H/O fracture of ankle      History of COPD      Other nonspecific abnormal finding of lung field      Status post ORIF of fracture of ankle                VITAL SIGNS:  Vital Signs Last 24 Hrs  T(C): 37.2 (23 Apr 2024 10:47), Max: 37.2 (23 Apr 2024 10:47)  T(F): 99 (23 Apr 2024 10:47), Max: 99 (23 Apr 2024 10:47)  HR: 87 (23 Apr 2024 10:47) (87 - 87)  BP: 143/66 (23 Apr 2024 10:47) (143/66 - 143/66)  BP(mean): --  RR: 16 (23 Apr 2024 10:47) (16 - 16)  SpO2: 95% (23 Apr 2024 10:47) (95% - 95%)        I/Os:   I&O's Detail      CAPILLARY BLOOD GLUCOSE          =======================MEDICATIONS===================  MEDICATIONS  (STANDING):  acetaminophen     Tablet .. 975 milliGRAM(s) Oral once  albuterol    0.083% 2.5 milliGRAM(s) Nebulizer every 6 hours  heparin   Injectable 5000 Unit(s) SubCutaneous every 8 hours  hydromorphone (10 MICROgram(s)/mL) + bupivacaine 0.0625% in 0.9% Sodium Chloride PCEA 250 milliLiter(s) Epidural PCA Continuous  lactated ringers. 1000 milliLiter(s) (30 mL/Hr) IV Continuous <Continuous>  lidocaine   4% Patch 1 Patch Transdermal daily  polyethylene glycol 3350 17 Gram(s) Oral daily  senna 2 Tablet(s) Oral at bedtime    MEDICATIONS  (PRN):  acetaminophen   IVPB .. 1000 milliGRAM(s) IV Intermittent once PRN Mild Pain (1 - 3)  hydromorphone (10 MICROgram(s)/mL) + bupivacaine 0.0625% in 0.9% Sodium Chloride PCEA Rescue Clinician  Bolus 5 milliLiter(s) Epidural every 15 minutes PRN for Pain Score greater than 6  naloxone Injectable 0.1 milliGRAM(s) IV Push every 3 minutes PRN For ANY of the following changes in patient status:  A. RR LESS THAN 10 breaths per minute, B. Oxygen saturation LESS THAN 90%, C. Sedation score of 6  ondansetron Injectable 4 milliGRAM(s) IV Push every 6 hours PRN Nausea      PHYSICAL EXAM============================  General:                         Awake, alert, not in any distress  Neuro:                            Moving all extremities to commands.   Respiratory:	Air entry fair and  bilateral conducted sounds                                           Effort even and unlabored.  CV:		Regular rate and rhythm. Normal S1/S2                                          Distal pulses present.  Abdomen:	                     Soft, non-distended. Bowel sounds present   Skin:		No rash.  Extremities:	Warm, no cyanosis or edema.  Palpable pulses    ============================LABS=========================              ABG - ( 23 Apr 2024 15:45 )  pH, Arterial: 7.37  pH, Blood: x     /  pCO2: 48    /  pO2: 231   / HCO3: 28    / Base Excess: 1.9   /  SaO2: 99.3          =============================NEUROLOGY============================  Pain control with PCA / Tylenol IV     ==============================RESPIRATORY========================  Pt is on  2  L nasal canula   Comfortable, not in any distress.  Using incentive spirometry   Monitor chest tube output  Chest tube to suction	  COPD: Continue bronchodilators, pulmonary toilet    ============================CARDIOVASCULAR======================  Continue hemodynamic monitoring.  Not on any pressors    =====================RENAL===================  Continue IVF   Monitor I/Os and electrolytes    ====================GASTROINTESTINAL===================  On clears, tolerating  Continue GI prophylaxis with  Protonix  Continue Zofran / Reglan for nausea - PRN	    ========================HEMATOLOGIC/ONCOLOGIC====================  Monitor chest tube output. No signs of active bleeding.   Follow CBC in AM    ============================INFECTIOUS DISEASE========================  Monitor for fever / leukocytosis.  All surgical incision / chest tube  sites look clean      Pt is on GI & DVT prophylaxis  OOB & ambulate       Pertinent clinical, laboratory, radiographic, hemodynamic, echocardiographic, respiratory data, microbiologic data and chart were reviewed and analyzed frequently throughout the course of the day and night  Patient seen, examined and plan discussed with CT Surgery / CTICU team during rounds.    Pt's status discussed with family at bedside, updated status        Keturah Bermudez DO FACEP    
Neurosurgery postop  Patient C/O surgical site pain well controlled   Vital Signs Last 24 Hrs  T(C): 37.2 (23 Apr 2024 10:47), Max: 37.2 (23 Apr 2024 10:47)  T(F): 99 (23 Apr 2024 10:47), Max: 99 (23 Apr 2024 10:47)  HR: 87 (23 Apr 2024 10:47) (87 - 87)  BP: 143/66 (23 Apr 2024 10:47) (143/66 - 143/66)  BP(mean): --  RR: 16 (23 Apr 2024 10:47) (16 - 16)  SpO2: 95% (23 Apr 2024 10:47) (95% - 95%)    AAO X 3  PERRLA, EOMI  Face symmetric  TRAN, RUE and Bilateral LE 5/5  LUE Deltoid 4/5, Biceps and triceps 4+/5, pain limited  Wrist extension 4+/5, HG, finger abduction, pincer 5/5  SILT    MEDICATIONS  (STANDING):  acetaminophen     Tablet .. 975 milliGRAM(s) Oral once  albuterol    0.083% 2.5 milliGRAM(s) Nebulizer every 6 hours  heparin   Injectable 5000 Unit(s) SubCutaneous every 8 hours  hydromorphone (10 MICROgram(s)/mL) + bupivacaine 0.0625% in 0.9% Sodium Chloride PCEA 250 milliLiter(s) Epidural PCA Continuous  lactated ringers. 1000 milliLiter(s) (30 mL/Hr) IV Continuous <Continuous>  lidocaine   4% Patch 1 Patch Transdermal daily  polyethylene glycol 3350 17 Gram(s) Oral daily  senna 2 Tablet(s) Oral at bedtime    MEDICATIONS  (PRN):  acetaminophen   IVPB .. 1000 milliGRAM(s) IV Intermittent once PRN Mild Pain (1 - 3)  hydromorphone (10 MICROgram(s)/mL) + bupivacaine 0.0625% in 0.9% Sodium Chloride PCEA Rescue Clinician  Bolus 5 milliLiter(s) Epidural every 15 minutes PRN for Pain Score greater than 6  naloxone Injectable 0.1 milliGRAM(s) IV Push every 3 minutes PRN For ANY of the following changes in patient status:  A. RR LESS THAN 10 breaths per minute, B. Oxygen saturation LESS THAN 90%, C. Sedation score of 6  ondansetron Injectable 4 milliGRAM(s) IV Push every 6 hours PRN Nausea

## 2024-04-29 NOTE — DISCHARGE NOTE NURSING/CASE MANAGEMENT/SOCIAL WORK - PATIENT PORTAL LINK FT
You can access the FollowMyHealth Patient Portal offered by [facility] by registering at the following website: [email or web address]. By joining [employer]’s FollowMyHealth portal, you will also be able to view your health information using other applications (apps) compatible with our system.

## 2024-04-29 NOTE — PROGRESS NOTE ADULT - PROVIDER SPECIALTY LIST ADULT
Anesthesia
CT Surgery
Critical Care
Critical Care
Pain Medicine
Pain Medicine
Thoracic Surgery
Critical Care
Pain Medicine
Neurosurgery

## 2024-04-29 NOTE — DISCHARGE NOTE NURSING/CASE MANAGEMENT/SOCIAL WORK - NSDCPEFALRISK_GEN_ALL_CORE
For information on Fall & Injury Prevention, visit: [email or web address] OR  [email or web address] OR  https://www.cdc.gov/steadi/patient.html

## 2024-05-09 ENCOUNTER — OUTPATIENT (OUTPATIENT)
Dept: OUTPATIENT SERVICES | Facility: HOSPITAL | Age: 69
LOS: 1 days | End: 2024-05-09
Payer: MEDICARE

## 2024-05-09 ENCOUNTER — RESULT REVIEW (OUTPATIENT)
Age: 69
End: 2024-05-09

## 2024-05-09 ENCOUNTER — APPOINTMENT (OUTPATIENT)
Dept: THORACIC SURGERY | Facility: CLINIC | Age: 69
End: 2024-05-09
Payer: MEDICARE

## 2024-05-09 ENCOUNTER — APPOINTMENT (OUTPATIENT)
Dept: RADIOLOGY | Facility: HOSPITAL | Age: 69
End: 2024-05-09

## 2024-05-09 VITALS
BODY MASS INDEX: 26.99 KG/M2 | RESPIRATION RATE: 16 BRPM | HEIGHT: 65 IN | HEART RATE: 107 BPM | OXYGEN SATURATION: 97 % | WEIGHT: 162 LBS | SYSTOLIC BLOOD PRESSURE: 133 MMHG | DIASTOLIC BLOOD PRESSURE: 67 MMHG

## 2024-05-09 DIAGNOSIS — Z98.890 OTHER SPECIFIED POSTPROCEDURAL STATES: Chronic | ICD-10-CM

## 2024-05-09 DIAGNOSIS — C34.90 MALIGNANT NEOPLASM OF UNSPECIFIED PART OF UNSPECIFIED BRONCHUS OR LUNG: ICD-10-CM

## 2024-05-09 DIAGNOSIS — C80.1 MALIGNANT (PRIMARY) NEOPLASM, UNSPECIFIED: ICD-10-CM

## 2024-05-09 PROCEDURE — 99024 POSTOP FOLLOW-UP VISIT: CPT

## 2024-05-09 PROCEDURE — 71046 X-RAY EXAM CHEST 2 VIEWS: CPT | Mod: 26

## 2024-05-09 NOTE — REASON FOR VISIT
[de-identified] : Diagnostic flexible bronchoscopy, left posterior lateral thoracotomy, resection of left Pancoast tumor including en bloc resection of portion of the 1st, 2nd, and 3rd ribs, MLND and [place]-Nikhil patch reconstruction of the chest wall. [de-identified] : 4/23/2024

## 2024-05-09 NOTE — ASSESSMENT
[FreeTextEntry1] : Ms. GODFREY ALVAREZ, 68 year old female, former smoker (1PPD x 50 years; Quit 2024), w/ hx of COPD. Late , was experiencing left sided rib pain that was progressive over 2 months as well as weight loss and a more porductive cough with dark sputum.  CT Chest revealing a SAMRA cavitary mass with adjacent rib destruction ans well as a 4 mm RLL nodule. Subsequent PET/CT in December demonstrating avidity to SAMRA mass. RLL below resolution of PET. 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 Now, s/p 3 cycles of neoadjuvant treatment with cisplatin, paclitaxel and nivolumab. Referred by Hem/Onc Dr. Vineet Melendez.  VQ scan on 2024: Differential perfusion: Right lun%; Left lun%  CT chest on 2024: - resultant minimal atelectasis is noted involving the posterior segments of both lower lobes.  - an approximately 5.2 x 5 cm spiculated mass is noted in the left upper lobe. The mass is extending through the chest wall into the left neck and left axillary region. - resultant destruction of the left first and second ribs is noted. The mass is causing very minimal displacement of the left subclavian/left axillary artery.  - 0.5 cm solid nodule is noted in the right lower lobe.  MRI on the left brachial plexus with and w/o Con on 2024: - there is a large enhancing mass along the left lung apex which destroys the and infiltrates through the left first and second ribs and extends into the soft tissues of the left lower neck into the region of the brachial plexus. - at the level of the interscalene triangle, the mass infiltrates into and likely involves the lower trunk of the brachial plexus. There is soft tissue infiltration and enhancement which also extends into the region of the middle trunk and to a lesser extent the upper trunk. The tumor closely approximates the traversing subclavian artery with loss of normal fat planes along the posterior aspect of the vertebral artery at this level. - at the level of the posterior clavicle beyond the first rib, the anterior and posterior divisions of the brachial plexus travel along the superior margin of the tumor. Soft tissue infiltration and enhancement is seen along some of the anterior and posterior divisions.   2024: Now s/p diagnostic flexible bronchoscopy, left posterior lateral thoracotomy, resection of left Pancoast tumor including en bloc resection of portion of the 1st, 2nd, and 3rd ribs, MLND and [place]-Nikhil patch reconstruction of the chest wall (joint case with Dr. Mac Brooks). 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 pT3N0 Stage IIB.  Addendum: PD-L1 Immunohistochemistry, Antibody: [place] PDL1 (), Tissue type:  Lung squamous cell carcinoma, Block: 8R. Result: Tumor Proportion Score (TPS*)   >75%. Interpretation: POSITIVE.   CXR today---reviewed  She presents today for post-op visit. Overall, she reports to be feeling well. Denies any chest pain, shortness of breath, cough, hemoptysis, fever, or chills.  Surgical incision healing well, no sign of infection noted.   I have reviewed the patient's medical records and diagnostic images at time of this office consultation and have made the following recommendation: 1. Path reviewed with patient, advised to follow up with heme/onc for further treatment plan. 2. RTC in 3 months with CXR.   Recommendations reviewed with patient during this office visit, and all questions answered; Patient instructed on the importance of follow up and verbalizes understanding.    I, NANCY Hazel, personally performed the evaluation and management (E/M) services for this established patient. That E/M includes conducting the examination, assessing all new/exacerbated conditions, and establishing a new plan of care. Today, my ACP, Alec Hernandez NP, was here to observe my evaluation and management services for this new problem/exacerbated condition to be followed going forward.

## 2024-07-22 NOTE — HISTORY OF PRESENT ILLNESS
[FreeTextEntry1] : Ms. GODFREY ALVAREZ, 68 year old female, former smoker (1PPD x 50 years; Quit 2024), w/ hx of COPD. Late , was experiencing left sided rib pain that was progressive over 2 months as well as weight loss and a more porductive cough with dark sputum.  CT Chest revealing a SAMRA cavitary mass with adjacent rib destruction ans well as a 4 mm RLL nodule. Subsequent PET/CT in December demonstrating avidity to SAMRA mass. RLL below resolution of PET. 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 Now, s/p 3 cycles of neoadjuvant treatment with cisplatin, paclitaxel and nivolumab. Referred by Hem/Onc Dr. Vineet Melendez.  VQ scan on 2024: Differential perfusion: Right lun%; Left lun%  CT chest on 2024: - resultant minimal atelectasis is noted involving the posterior segments of both lower lobes.  - an approximately 5.2 x 5 cm spiculated mass is noted in the left upper lobe. The mass is extending through the chest wall into the left neck and left axillary region. - resultant destruction of the left first and second ribs is noted. The mass is causing very minimal displacement of the left subclavian/left axillary artery.  - 0.5 cm solid nodule is noted in the right lower lobe.  MRI on the left brachial plexus with and w/o Con on 2024: - there is a large enhancing mass along the left lung apex which destroys the and infiltrates through the left first and second ribs and extends into the soft tissues of the left lower neck into the region of the brachial plexus. - at the level of the interscalene triangle, the mass infiltrates into and likely involves the lower trunk of the brachial plexus. There is soft tissue infiltration and enhancement which also extends into the region of the middle trunk and to a lesser extent the upper trunk. The tumor closely approximates the traversing subclavian artery with loss of normal fat planes along the posterior aspect of the vertebral artery at this level. - at the level of the posterior clavicle beyond the first rib, the anterior and posterior divisions of the brachial plexus travel along the superior margin of the tumor. Soft tissue infiltration and enhancement is seen along some of the anterior and posterior divisions.   2024: Now s/p diagnostic flexible bronchoscopy, left posterior lateral thoracotomy, resection of left Pancoast tumor including en bloc resection of portion of the 1st, 2nd, and 3rd ribs, MLND and [place]-Nikhil patch reconstruction of the chest wall (joint case with Dr. Mac Brooks). 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 pT3N0 Stage IIB.  Addendum: PD-L1 Immunohistochemistry, Antibody: [place] PDL1 (), Tissue type:  Lung squamous cell carcinoma, Block: 8R. Result: Tumor Proportion Score (TPS*)   >75%. Interpretation: POSITIVE.   F/u with Hem/Onc?   CXR today:   Depression screening completed on   Patient is here today for a follow up.

## 2024-07-22 NOTE — ASSESSMENT
[FreeTextEntry1] : Ms. GODFREY ALVAREZ, 68 year old female, former smoker (1PPD x 50 years; Quit Jan 2024), w/ hx of COPD. Late 2023, was experiencing left sided rib pain that was progressive over 2 months as well as weight loss and a more porductive cough with dark sputum. November, 2023 CT Chest revealing a SAMRA cavitary mass with adjacent rib destruction ans well as a 4 mm RLL nodule. Subsequent PET/CT in December demonstrating avidity to SAMRA mass. RLL below resolution of PET. 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 Now, s/p 3 cycles of neoadjuvant treatment with cisplatin, paclitaxel and nivolumab. Referred by Hem/Onc Dr. Vineet Melendez.  04/23/2024: Now s/p diagnostic flexible bronchoscopy, left posterior lateral thoracotomy, resection of left Pancoast tumor including en bloc resection of portion of the 1st, 2nd, and 3rd ribs, MLND and [place]-Nikhil patch reconstruction of the chest wall (joint case with Dr. Mac Brooks). 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 pT3N0 Stage IIB.  Addendum: PD-L1 Immunohistochemistry, Antibody: [place] PDL1 (), Tissue type:  Lung squamous cell carcinoma, Block: 8R. Result: Tumor Proportion Score (TPS*)   >75%. Interpretation: POSITIVE.   F/u with Hem/Onc?   CXR today:   Depression screening completed on    I have reviewed the patient's medical records and diagnostic images at time of this office consultation and have made the following recommendation: 1.

## 2024-08-01 ENCOUNTER — NON-APPOINTMENT (OUTPATIENT)
Age: 69
End: 2024-08-01

## 2024-08-01 ENCOUNTER — OUTPATIENT (OUTPATIENT)
Dept: OUTPATIENT SERVICES | Facility: HOSPITAL | Age: 69
LOS: 1 days | End: 2024-08-01
Payer: MEDICARE

## 2024-08-01 ENCOUNTER — APPOINTMENT (OUTPATIENT)
Dept: RADIOLOGY | Facility: HOSPITAL | Age: 69
End: 2024-08-01

## 2024-08-01 ENCOUNTER — APPOINTMENT (OUTPATIENT)
Dept: THORACIC SURGERY | Facility: CLINIC | Age: 69
End: 2024-08-01
Payer: MEDICARE

## 2024-08-01 ENCOUNTER — RESULT REVIEW (OUTPATIENT)
Age: 69
End: 2024-08-01

## 2024-08-01 VITALS
RESPIRATION RATE: 17 BRPM | BODY MASS INDEX: 29.32 KG/M2 | OXYGEN SATURATION: 98 % | SYSTOLIC BLOOD PRESSURE: 166 MMHG | HEART RATE: 78 BPM | WEIGHT: 176 LBS | DIASTOLIC BLOOD PRESSURE: 80 MMHG | HEIGHT: 65 IN

## 2024-08-01 DIAGNOSIS — C34.90 MALIGNANT NEOPLASM OF UNSPECIFIED PART OF UNSPECIFIED BRONCHUS OR LUNG: ICD-10-CM

## 2024-08-01 DIAGNOSIS — C80.1 MALIGNANT (PRIMARY) NEOPLASM, UNSPECIFIED: ICD-10-CM

## 2024-08-01 DIAGNOSIS — M79.89 OTHER SPECIFIED SOFT TISSUE DISORDERS: ICD-10-CM

## 2024-08-01 PROCEDURE — 71046 X-RAY EXAM CHEST 2 VIEWS: CPT | Mod: 26

## 2024-08-01 PROCEDURE — 99214 OFFICE O/P EST MOD 30 MIN: CPT

## 2024-08-02 PROBLEM — M79.89 ARM SWELLING: Status: ACTIVE | Noted: 2024-08-01

## 2024-08-02 NOTE — REASON FOR VISIT
"ED Provider Note    Scribed for Baudilio Michaud M.D. by J Carlos Lambert. 3/22/2017, 11:36 PM.    Primary care provider: Noman Tidwell M.D.  Means of arrival: walk-in  History obtained from: Parent  History limited by: None    CHIEF COMPLAINT  Chief Complaint   Patient presents with   • Ear Pain     left ear pain that started today       HPI  Omkar Caldera is a 3 y.o. male who presents to the Emergency Department for evaluation of left ear pain, onset approximately 3-4 hours ago today. Per father, he uses an ear spray at night. He denies any cough. Patient was not given any Motrin or Tylenol prior to visit.     REVIEW OF SYSTEMS  See HPI for further details. E.     PAST MEDICAL HISTORY   has a past medical history of UTI (lower urinary tract infection).  Immunizations are  up to date.    SURGICAL HISTORY  patient denies any surgical history    SOCIAL HISTORY  Accompanied by parents     FAMILY HISTORY  None noted    CURRENT MEDICATIONS  Reviewed.  See Encounter Summary.     ALLERGIES  Allergies   Allergen Reactions   • Eggs    • Peanuts [Peanut Oil]    • Tree Nuts Food Allergy        PHYSICAL EXAM  VITAL SIGNS: /72 mmHg  Pulse 101  Temp(Src) 37.4 °C (99.3 °F)  Resp 22  Ht 1.067 m (3' 6\")  Wt 15.4 kg (33 lb 15.2 oz)  BMI 13.53 kg/m2  SpO2 99%  Constitutional: Alert in no apparent distress. Happy, Playful.  HENT: Normocephalic, Atraumatic, Bilateral external ears normal, Nose normal. Moist mucous membranes.  Eyes: Pupils are equal and reactive, Conjunctiva normal, Non-icteric.   Ears: Right TM normal. Left TM injected and dull, with exudative effusion.    Throat: Midline uvula, No exudate. Moist mucus membranes. No oral lesions.   Neck: Normal range of motion, No tenderness, Supple, No stridor. No evidence of meningeal irritation.  Lymphatic: No lymphadenopathy noted.   Cardiovascular: Regular rate and rhythm, no murmurs.   Thorax & Lungs: Normal breath sounds, No respiratory distress, No " wheezing.    Abdomen: Bowel sounds normal, Soft, No tenderness, No masses.  Skin: Warm, Dry, No erythema, No rash, No Petechiae.   Musculoskeletal: Good range of motion in all major joints. No tenderness to palpation or major deformities noted.   Neurologic: Alert, Normal motor function, Normal sensory function, No focal deficits noted.   Psychiatric: Playful, non-toxic in appearance and behavior.     DIAGNOSTIC STUDIES / PROCEDURES     COURSE & MEDICAL DECISION MAKING  Nursing notes, VS, PMSFHx reviewed in chart.    11:36 PM - Patient seen and examined at bedside. Discussed with the family that the patient has and ear infection.  Patient will be treated with Motrin 154 mg, Amoxicillin 400 mg.     11:49 PM Patient reevaluated at bedside. Patient is resting. Discussed the plan of care with the parents. Recommended they administer Tylenol and Motrin every 3 hours alternately for pain.    Decision Making:  This is a 3 y.o. year old male who presents with left ear pain. History and physical was as above. Exam consistent with otitis media. We'll start empiric antibiotics and outpatient follow-up with primary care pediatrician. There is a return precautions. Father is understanding of Tylenol Motrin for home pain and fever control as needed.    The patient will return for new or worsening symptoms and is stable at the time of discharge.    DISPOSITION:  Patient will be discharged home in stable condition.    FOLLOW UP:  Noman Tidwell M.D.  [address] [place] [id number]  [phone]    In 3 days  use tylenol/motrin for pain and fever. finish antibiotics      OUTPATIENT MEDICATIONS:  Discharge Medication List as of 3/22/2017 11:42 PM      START taking these medications    Details   amoxicillin (AMOXIL) 400 MG/5ML suspension Take 8.7 mL by mouth 2 times a day for 5 days., Disp-87 mL, R-0, Print Rx Paper                 FINAL IMPRESSION  1. Acute suppurative otitis media of left ear without spontaneous rupture of  tympanic membrane, recurrence not specified          IJ Carlos (Scribe), am scribing for, and in the presence of, Baudilio Michaud M.D..    Electronically signed by: J Carlos Lambert (Scribe), 3/22/2017    IBaudilio M.D. personally performed the services described in this documentation, as scribed by J Carlos Lambert in my presence, and it is both accurate and complete.    The note accurately reflects work and decisions made by me.  Baudilio Michaud  3/23/2017  1:29 AM         [Follow-Up: _____] : a [unfilled] follow-up visit

## 2024-08-02 NOTE — HISTORY OF PRESENT ILLNESS
[FreeTextEntry1] : Ms. GODFREY ALVAREZ, 68 year old female, former smoker (1PPD x 50 years; Quit 2024), w/ hx of COPD. Late , was experiencing left sided rib pain that was progressive over 2 months as well as weight loss and a more porductive cough with dark sputum.  CT Chest revealing a SAMRA cavitary mass with adjacent rib destruction ans well as a 4 mm RLL nodule. Subsequent PET/CT in December demonstrating avidity to SAMRA mass. RLL below resolution of PET. 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 Now, s/p 3 cycles of neoadjuvant treatment with cisplatin, paclitaxel and nivolumab. Referred by Hem/Onc Dr. Vineet Melendez.  VQ scan on 2024: Differential perfusion: Right lun%; Left lun%  CT chest on 2024: - resultant minimal atelectasis is noted involving the posterior segments of both lower lobes.  - an approximately 5.2 x 5 cm spiculated mass is noted in the left upper lobe. The mass is extending through the chest wall into the left neck and left axillary region. - resultant destruction of the left first and second ribs is noted. The mass is causing very minimal displacement of the left subclavian/left axillary artery.  - 0.5 cm solid nodule is noted in the right lower lobe.  MRI on the left brachial plexus with and w/o Con on 2024: - there is a large enhancing mass along the left lung apex which destroys the and infiltrates through the left first and second ribs and extends into the soft tissues of the left lower neck into the region of the brachial plexus. - at the level of the interscalene triangle, the mass infiltrates into and likely involves the lower trunk of the brachial plexus. There is soft tissue infiltration and enhancement which also extends into the region of the middle trunk and to a lesser extent the upper trunk. The tumor closely approximates the traversing subclavian artery with loss of normal fat planes along the posterior aspect of the vertebral artery at this level. - at the level of the posterior clavicle beyond the first rib, the anterior and posterior divisions of the brachial plexus travel along the superior margin of the tumor. Soft tissue infiltration and enhancement is seen along some of the anterior and posterior divisions.   2024: Now s/p diagnostic flexible bronchoscopy, left posterior lateral thoracotomy, resection of left Pancoast tumor including en bloc resection of portion of the 1st, 2nd, and 3rd ribs, MLND and [place]-Nikhil patch reconstruction of the chest wall (joint case with Dr. Mac Brooks). 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 pT3N0 Stage IIB.  Addendum: PD-L1 Immunohistochemistry, Antibody: [place] PDL1 (), Tissue type:  Lung squamous cell carcinoma, Block: 8R. Result: Tumor Proportion Score (TPS*)   >75%. Interpretation: POSITIVE.   F/u with Hem/Onc Dr. Melendez, on Keytruda now Q3wks.   CT Chest on 24:  - post-op changes - stable 6 mm nodule in RLL  CXR today: reviewed.   Depression screening completed on 24.   Patient is here today for a follow up. Pt c/o Lt arm and hand swelling since 3 mons ago, recently worsening. Dr. Melendez thinks it is not related to Keytruda.

## 2024-08-02 NOTE — HISTORY OF PRESENT ILLNESS
[FreeTextEntry1] : Ms. GODFREY ALVAREZ, 68 year old female, former smoker (1PPD x 50 years; Quit 2024), w/ hx of COPD. Late , was experiencing left sided rib pain that was progressive over 2 months as well as weight loss and a more porductive cough with dark sputum.  CT Chest revealing a SAMRA cavitary mass with adjacent rib destruction ans well as a 4 mm RLL nodule. Subsequent PET/CT in December demonstrating avidity to SAMRA mass. RLL below resolution of PET. 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 Now, s/p 3 cycles of neoadjuvant treatment with cisplatin, paclitaxel and nivolumab. Referred by Hem/Onc Dr. Vineet Melendez.  VQ scan on 2024: Differential perfusion: Right lun%; Left lun%  CT chest on 2024: - resultant minimal atelectasis is noted involving the posterior segments of both lower lobes.  - an approximately 5.2 x 5 cm spiculated mass is noted in the left upper lobe. The mass is extending through the chest wall into the left neck and left axillary region. - resultant destruction of the left first and second ribs is noted. The mass is causing very minimal displacement of the left subclavian/left axillary artery.  - 0.5 cm solid nodule is noted in the right lower lobe.  MRI on the left brachial plexus with and w/o Con on 2024: - there is a large enhancing mass along the left lung apex which destroys the and infiltrates through the left first and second ribs and extends into the soft tissues of the left lower neck into the region of the brachial plexus. - at the level of the interscalene triangle, the mass infiltrates into and likely involves the lower trunk of the brachial plexus. There is soft tissue infiltration and enhancement which also extends into the region of the middle trunk and to a lesser extent the upper trunk. The tumor closely approximates the traversing subclavian artery with loss of normal fat planes along the posterior aspect of the vertebral artery at this level. - at the level of the posterior clavicle beyond the first rib, the anterior and posterior divisions of the brachial plexus travel along the superior margin of the tumor. Soft tissue infiltration and enhancement is seen along some of the anterior and posterior divisions.   2024: Now s/p diagnostic flexible bronchoscopy, left posterior lateral thoracotomy, resection of left Pancoast tumor including en bloc resection of portion of the 1st, 2nd, and 3rd ribs, MLND and [place]-Nikhil patch reconstruction of the chest wall (joint case with Dr. Mac Brooks). 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 pT3N0 Stage IIB.  Addendum: PD-L1 Immunohistochemistry, Antibody: [place] PDL1 (), Tissue type:  Lung squamous cell carcinoma, Block: 8R. Result: Tumor Proportion Score (TPS*)   >75%. Interpretation: POSITIVE.   F/u with Hem/Onc Dr. Melendez, on Keytruda now Q3wks.   CT Chest on 24:  - post-op changes - stable 6 mm nodule in RLL  CXR today: reviewed.   Depression screening completed on 24.   Patient is here today for a follow up. Pt c/o Lt arm and hand swelling since 3 mons ago, recently worsening. Dr. Melendez thinks it is not related to Keytruda.

## 2024-08-02 NOTE — CONSULT LETTER
[Dear  ___] : Dear  [unfilled], [Consult Letter:] : I had the pleasure of evaluating your patient, [unfilled]. [( Thank you for referring [unfilled] for consultation for _____ )] : Thank you for referring [unfilled] for consultation for [unfilled] [Please see my note below.] : Please see my note below. [Consult Closing:] : Thank you very much for allowing me to participate in the care of this patient.  If you have any questions, please do not hesitate to contact me. [Sincerely,] : Sincerely, [FreeTextEntry2] : Dr. Vineet Melendez (Hem/Onc/Referring) Dr. Cheng Salcedo (Pulm [FreeTextEntry3] : Maurice Guan MD, MPH  System Director of Thoracic Surgery  Director of Comprehensive Lung and Foregut [place]  Professor Cardiovascular & Thoracic Surgery   [facility] [employer] [facility] [phone] [place] [address] [place] [address] Tel: [phone] Fax: [phone]

## 2024-08-02 NOTE — ASSESSMENT
[FreeTextEntry1] : Ms. GODFREY ALVAREZ, 68 year old female, former smoker (1PPD x 50 years; Quit 2024), w/ hx of COPD. Late , was experiencing left sided rib pain that was progressive over 2 months as well as weight loss and a more porductive cough with dark sputum.  CT Chest revealing a SAMRA cavitary mass with adjacent rib destruction ans well as a 4 mm RLL nodule. Subsequent PET/CT in December demonstrating avidity to SAMRA mass. RLL below resolution of PET. 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 Now, s/p 3 cycles of neoadjuvant treatment with cisplatin, paclitaxel and nivolumab. Referred by Hem/Onc Dr. Vineet Melendez.  VQ scan on 2024: Differential perfusion: Right lun%; Left lun%  CT chest on 2024: - resultant minimal atelectasis is noted involving the posterior segments of both lower lobes.  - an approximately 5.2 x 5 cm spiculated mass is noted in the left upper lobe. The mass is extending through the chest wall into the left neck and left axillary region. - resultant destruction of the left first and second ribs is noted. The mass is causing very minimal displacement of the left subclavian/left axillary artery.  - 0.5 cm solid nodule is noted in the right lower lobe.  MRI on the left brachial plexus with and w/o Con on 2024: - there is a large enhancing mass along the left lung apex which destroys the and infiltrates through the left first and second ribs and extends into the soft tissues of the left lower neck into the region of the brachial plexus. - at the level of the interscalene triangle, the mass infiltrates into and likely involves the lower trunk of the brachial plexus. There is soft tissue infiltration and enhancement which also extends into the region of the middle trunk and to a lesser extent the upper trunk. The tumor closely approximates the traversing subclavian artery with loss of normal fat planes along the posterior aspect of the vertebral artery at this level. - at the level of the posterior clavicle beyond the first rib, the anterior and posterior divisions of the brachial plexus travel along the superior margin of the tumor. Soft tissue infiltration and enhancement is seen along some of the anterior and posterior divisions.   2024: Now s/p diagnostic flexible bronchoscopy, left posterior lateral thoracotomy, resection of left Pancoast tumor including en bloc resection of portion of the 1st, 2nd, and 3rd ribs, MLND and Maria [place]-Nikhil patch reconstruction of the chest wall (joint case with Dr. Mac Brooks). 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 pT3N0 Stage IIB.  Addendum: PD-L1 Immunohistochemistry, Antibody: [place] PDL1 (), Tissue type:  Lung squamous cell carcinoma, Block: 8R. Result: Tumor Proportion Score (TPS*)   >75%. Interpretation: POSITIVE.   F/u with Hem/Onc Dr. Melendez, on Keytruda now Q3wks.   CT Chest on 24:  - post-op changes - stable 6 mm nodule in RLL  CXR today: reviewed.    I have reviewed the patient's medical records and diagnostic images at time of this office consultation and have made the following recommendation: 1. CT and CXR reviewed, stable scan, ASHLEY. Lt arm and hand swelling, I will order US of upper extremity and MRA/MRV with abduction and adduction to r/o clots and vessels obstruction.    I, Dr. AGUILAR, NANCY DELEON, personally performed the evaluation and management (E/M) services for this established patient who presents today with (a) new problem(s)/exacerbation of (an) existing condition(s).  That E/M includes conducting the examination, assessing all new/exacerbated conditions, and establishing a new plan of care.  Today, my ACP, Cassie Snow, MALCOLM-BC was here to observe my evaluation and management services for this new problem/exacerbated condition to be followed going forward.

## 2024-08-02 NOTE — ASSESSMENT
[FreeTextEntry1] : Ms. GODFREY ALVAREZ, 68 year old female, former smoker (1PPD x 50 years; Quit 2024), w/ hx of COPD. Late , was experiencing left sided rib pain that was progressive over 2 months as well as weight loss and a more porductive cough with dark sputum.  CT Chest revealing a SAMRA cavitary mass with adjacent rib destruction ans well as a 4 mm RLL nodule. Subsequent PET/CT in December demonstrating avidity to SAMRA mass. RLL below resolution of PET. 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 Now, s/p 3 cycles of neoadjuvant treatment with cisplatin, paclitaxel and nivolumab. Referred by Hem/Onc Dr. Vineet Melendez.  VQ scan on 2024: Differential perfusion: Right lun%; Left lun%  CT chest on 2024: - resultant minimal atelectasis is noted involving the posterior segments of both lower lobes.  - an approximately 5.2 x 5 cm spiculated mass is noted in the left upper lobe. The mass is extending through the chest wall into the left neck and left axillary region. - resultant destruction of the left first and second ribs is noted. The mass is causing very minimal displacement of the left subclavian/left axillary artery.  - 0.5 cm solid nodule is noted in the right lower lobe.  MRI on the left brachial plexus with and w/o Con on 2024: - there is a large enhancing mass along the left lung apex which destroys the and infiltrates through the left first and second ribs and extends into the soft tissues of the left lower neck into the region of the brachial plexus. - at the level of the interscalene triangle, the mass infiltrates into and likely involves the lower trunk of the brachial plexus. There is soft tissue infiltration and enhancement which also extends into the region of the middle trunk and to a lesser extent the upper trunk. The tumor closely approximates the traversing subclavian artery with loss of normal fat planes along the posterior aspect of the vertebral artery at this level. - at the level of the posterior clavicle beyond the first rib, the anterior and posterior divisions of the brachial plexus travel along the superior margin of the tumor. Soft tissue infiltration and enhancement is seen along some of the anterior and posterior divisions.   2024: Now s/p diagnostic flexible bronchoscopy, left posterior lateral thoracotomy, resection of left Pancoast tumor including en bloc resection of portion of the 1st, 2nd, and 3rd ribs, MLND and [place]-Nikhil patch reconstruction of the chest wall (joint case with Dr. Mac Brooks). 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 pT3N0 Stage IIB.  Addendum: PD-L1 Immunohistochemistry, Antibody: [place] PDL1 (), Tissue type:  Lung squamous cell carcinoma, Block: 8R. Result: Tumor Proportion Score (TPS*)   >75%. Interpretation: POSITIVE.   F/u with Hem/Onc Dr. Melendez, on Keytruda now Q3wks.   CT Chest on 24:  - post-op changes - stable 6 mm nodule in RLL  CXR today: reviewed.    I have reviewed the patient's medical records and diagnostic images at time of this office consultation and have made the following recommendation: 1. CT and CXR reviewed, stable scan, ASHLEY. Lt arm and hand swelling, I will order US of upper extremity and MRA/MRV with abduction and adduction to r/o clots and vessels obstruction.    I, Dr. AGUILAR, NANCY DELEON, personally performed the evaluation and management (E/M) services for this established patient who presents today with (a) new problem(s)/exacerbation of (an) existing condition(s).  That E/M includes conducting the examination, assessing all new/exacerbated conditions, and establishing a new plan of care.  Today, my ACP, Cassie Snow, MALCOLM-BC was here to observe my evaluation and management services for this new problem/exacerbated condition to be followed going forward.

## 2024-08-02 NOTE — CONSULT LETTER
[Dear  ___] : Dear  [unfilled], [Consult Letter:] : I had the pleasure of evaluating your patient, [unfilled]. [( Thank you for referring [unfilled] for consultation for _____ )] : Thank you for referring [unfilled] for consultation for [unfilled] [Please see my note below.] : Please see my note below. [Consult Closing:] : Thank you very much for allowing me to participate in the care of this patient.  If you have any questions, please do not hesitate to contact me. [Sincerely,] : Sincerely, [FreeTextEntry2] : Dr. Vineet Melendez (Hem/Onc/Referring) Dr. Cheng Salcedo (Pulm [FreeTextEntry3] : Maurice Guan MD, MPH  System Director of Thoracic Surgery  Director of Comprehensive Lung and Foregut [place]  Professor Cardiovascular & Thoracic Surgery   [facility] [employer] [facility] at [facility] [phone] [place] [address] [place] [address] Tel: [phone] Fax: [phone]

## 2024-08-07 ENCOUNTER — APPOINTMENT (OUTPATIENT)
Dept: ULTRASOUND IMAGING | Facility: CLINIC | Age: 69
End: 2024-08-07

## 2024-08-07 ENCOUNTER — APPOINTMENT (OUTPATIENT)
Dept: MRI IMAGING | Facility: CLINIC | Age: 69
End: 2024-08-07

## 2024-08-07 PROCEDURE — 93971 EXTREMITY STUDY: CPT | Mod: RT

## 2024-08-07 PROCEDURE — A9585: CPT | Mod: JZ

## 2024-08-07 PROCEDURE — 71555 MRI ANGIO CHEST W OR W/O DYE: CPT

## 2024-08-15 DIAGNOSIS — G62.9 POLYNEUROPATHY, UNSPECIFIED: ICD-10-CM

## 2024-08-15 RX ORDER — GABAPENTIN 300 MG/1
300 CAPSULE ORAL
Qty: 60 | Refills: 2 | Status: ACTIVE | COMMUNITY
Start: 2024-08-15 | End: 1900-01-01

## 2024-10-05 ENCOUNTER — NON-APPOINTMENT (OUTPATIENT)
Age: 69
End: 2024-10-05

## 2024-10-17 ENCOUNTER — APPOINTMENT (OUTPATIENT)
Dept: NEUROLOGY | Facility: CLINIC | Age: 69
End: 2024-10-17

## 2024-11-01 ENCOUNTER — APPOINTMENT (OUTPATIENT)
Dept: NEUROLOGY | Facility: CLINIC | Age: 69
End: 2024-11-01

## 2024-11-04 ENCOUNTER — NON-APPOINTMENT (OUTPATIENT)
Age: 69
End: 2024-11-04

## 2024-12-26 ENCOUNTER — APPOINTMENT (OUTPATIENT)
Dept: THORACIC SURGERY | Facility: CLINIC | Age: 69
End: 2024-12-26
Payer: MEDICARE

## 2024-12-26 DIAGNOSIS — C34.90 MALIGNANT NEOPLASM OF UNSPECIFIED PART OF UNSPECIFIED BRONCHUS OR LUNG: ICD-10-CM

## 2024-12-26 PROCEDURE — 99443: CPT | Mod: 93

## 2024-12-27 ENCOUNTER — TRANSCRIPTION ENCOUNTER (OUTPATIENT)
Age: 69
End: 2024-12-27

## 2025-03-14 ENCOUNTER — NON-APPOINTMENT (OUTPATIENT)
Age: 70
End: 2025-03-14

## 2025-07-10 ENCOUNTER — APPOINTMENT (OUTPATIENT)
Dept: THORACIC SURGERY | Facility: CLINIC | Age: 70
End: 2025-07-10

## (undated) DEVICE — SYR LUER LOK 20CC

## (undated) DEVICE — ADAPTER FIBEROPTIC BRONCHOSCOPE DUAL AXIS SWIVEL

## (undated) DEVICE — DRAPE IOBAN 33" X 23"

## (undated) DEVICE — CONNECTOR CARDIAC 1:1 FOR HUBLESS DRAINS

## (undated) DEVICE — Device

## (undated) DEVICE — SOL ANTI FOG

## (undated) DEVICE — PREP CHLORAPREP HI-LITE ORANGE 26ML

## (undated) DEVICE — TUBING CODMAN INTEGRATED BIPOLAR CORD & TUBING SET FLYING LEADS

## (undated) DEVICE — SUT MONOCRYL 4-0 27" PS-2 UNDYED

## (undated) DEVICE — DRSG BENZOIN 0.6CC

## (undated) DEVICE — ELCTR GROUNDING PAD ADULT COVIDIEN

## (undated) DEVICE — STAPLER COVIDIEN ENDO GIA SHORT HANDLE

## (undated) DEVICE — ELCTR EXTENSION STRAIGHT

## (undated) DEVICE — VISITEC 4X4

## (undated) DEVICE — DRAIN RESERVOIR FOR JACKSON PRATT 100CC CARDINAL

## (undated) DEVICE — MISONIX BONESCALPEL BLUNT BLADE & TUBESET 20MM

## (undated) DEVICE — SUT VICRYL 2-0 36" CT-1 UNDYED

## (undated) DEVICE — NDL HYPO SAFE 22G X 1.5" (BLACK)

## (undated) DEVICE — DRAPE TOWEL BLUE STICKY

## (undated) DEVICE — PACK MINOR WITH LAP

## (undated) DEVICE — DRSG CURITY GAUZE SPONGE 4 X 4" 12-PLY

## (undated) DEVICE — VALVE BIOPSY BRONCHOVIDEOSCOPE

## (undated) DEVICE — CHEST DRAIN OASIS DRY SUCTION WATER SEAL

## (undated) DEVICE — FRAZIER SUCTION TIP 12FR

## (undated) DEVICE — DRAPE INSTRUMENT POUCH 6.75" X 11"

## (undated) DEVICE — DRSG DERMABOND PRINEO 60CM

## (undated) DEVICE — DRAPE MAGNETIC INSTRUMENT MEDIUM

## (undated) DEVICE — TAPE SILK 3"

## (undated) DEVICE — ELCTR BOVIE TIP BLADE INSULATED 6.5" EDGE

## (undated) DEVICE — DRAPE THYROID 77" X 123"

## (undated) DEVICE — DRSG TEGADERM 4X4.75"

## (undated) DEVICE — DRAPE 3/4 SHEET 52X76"

## (undated) DEVICE — TROCAR ETHICON ENDOPATH RIGID THORACIC 10/12MM X 75MM RIDGID

## (undated) DEVICE — ENDOCATCH GENERAL 10MM (PURPLE)

## (undated) DEVICE — SYR LUER LOK 10CC

## (undated) DEVICE — SYR SLIP 10CC

## (undated) DEVICE — DRSG TELFA 3 X 8

## (undated) DEVICE — POSITIONER STRAP ARMBOARD VELCRO TS-30

## (undated) DEVICE — CONNECTOR REDUCING STRAIGHT 3/8X0.25"

## (undated) DEVICE — TUBING SUCTION NONCONDUCTIVE 6MM X 12FT

## (undated) DEVICE — WARMING BLANKET LOWER ADULT

## (undated) DEVICE — VALVE SUCTION EVIS 160/200/240

## (undated) DEVICE — SUT VICRYL 3-0 27" SH UNDYED

## (undated) DEVICE — FRAZIER SUCTION TIP 18FR

## (undated) DEVICE — SUT VICRYL 0 27" UR-6

## (undated) DEVICE — SOL INJ LR 1000ML

## (undated) DEVICE — ELCTR BOVIE TIP BLADE INSULATED 2.75" EDGE

## (undated) DEVICE — POSITIONER FOAM EGG CRATE ULNAR 2PCS (PINK)

## (undated) DEVICE — NDL HYPO SAFE 18G X 1.5" (PINK)

## (undated) DEVICE — DRAPE IOBAN 23" X 23"

## (undated) DEVICE — BALLOON SINGLE FOR BF-UC160F

## (undated) DEVICE — DRSG STERISTRIPS 0.5 X 4"

## (undated) DEVICE — SUT PROLENE 0 30" CT-1

## (undated) DEVICE — NDL ASPIRATION 22G W SYR

## (undated) DEVICE — SUT SILK 0 18" TIES

## (undated) DEVICE — STAPLER COVIDIEN ENDO GIA STANDARD HANDLE

## (undated) DEVICE — DRAPE STERI-DRAPE MEDIUM W APERTURE

## (undated) DEVICE — SUT SILK 2-0 30" TIES

## (undated) DEVICE — ENDOCATCH GENERAL 15MM (PURPLE)

## (undated) DEVICE — VENODYNE/SCD SLEEVE CALF MEDIUM

## (undated) DEVICE — STOPCOCK 3 WAY HI-FLO

## (undated) DEVICE — PACK MAJOR ABDOMINAL W ENDO DRAPE

## (undated) DEVICE — FOLEY TRAY 16FR 5CC LF UMETER CLOSED

## (undated) DEVICE — SPONGE DISSECTOR PEANUT

## (undated) DEVICE — DRAPE LARGE SHEET 72X85"

## (undated) DEVICE — SUT VICRYL PLUS 2 27" TP-1 UNDYED

## (undated) DEVICE — LIJ-LERUTE VIDEO MEDIASTINOSCOPY TRAY: Type: DURABLE MEDICAL EQUIPMENT

## (undated) DEVICE — DRSG OPSITE 2.5 X 2"

## (undated) DEVICE — DRAPE GENERAL ENDOSCOPY

## (undated) DEVICE — DISSECTOR ENDOSCOPIC KITTNER SINGLE TIP

## (undated) DEVICE — SUT VICRYL 0 27" CT-1 UNDYED

## (undated) DEVICE — HAND-AID ARTERIAL WRIST SUPPORT